# Patient Record
Sex: MALE | Race: WHITE | NOT HISPANIC OR LATINO | Employment: OTHER | URBAN - METROPOLITAN AREA
[De-identification: names, ages, dates, MRNs, and addresses within clinical notes are randomized per-mention and may not be internally consistent; named-entity substitution may affect disease eponyms.]

---

## 2023-10-12 ENCOUNTER — MEDICAL CORRESPONDENCE (OUTPATIENT)
Dept: HEALTH INFORMATION MANAGEMENT | Facility: CLINIC | Age: 63
End: 2023-10-12

## 2023-10-16 ENCOUNTER — TRANSCRIBE ORDERS (OUTPATIENT)
Dept: OTHER | Age: 63
End: 2023-10-16

## 2023-10-16 DIAGNOSIS — G35 MULTIPLE SCLEROSIS (H): Primary | ICD-10-CM

## 2023-10-16 DIAGNOSIS — G25.81 RESTLESS LEG SYNDROME: ICD-10-CM

## 2023-10-22 ENCOUNTER — HEALTH MAINTENANCE LETTER (OUTPATIENT)
Age: 63
End: 2023-10-22

## 2023-11-15 ENCOUNTER — PRE VISIT (OUTPATIENT)
Dept: NEUROLOGY | Facility: CLINIC | Age: 63
End: 2023-11-15

## 2023-11-15 ENCOUNTER — TELEPHONE (OUTPATIENT)
Dept: NEUROLOGY | Facility: CLINIC | Age: 63
End: 2023-11-15

## 2023-11-15 ENCOUNTER — LAB (OUTPATIENT)
Dept: LAB | Facility: CLINIC | Age: 63
End: 2023-11-15
Payer: COMMERCIAL

## 2023-11-15 ENCOUNTER — OFFICE VISIT (OUTPATIENT)
Dept: NEUROLOGY | Facility: CLINIC | Age: 63
End: 2023-11-15
Attending: PSYCHIATRY & NEUROLOGY
Payer: COMMERCIAL

## 2023-11-15 VITALS
WEIGHT: 262.3 LBS | BODY MASS INDEX: 31.94 KG/M2 | HEART RATE: 56 BPM | SYSTOLIC BLOOD PRESSURE: 142 MMHG | DIASTOLIC BLOOD PRESSURE: 86 MMHG | OXYGEN SATURATION: 98 % | HEIGHT: 76 IN

## 2023-11-15 DIAGNOSIS — Z51.81 THERAPEUTIC DRUG MONITORING: ICD-10-CM

## 2023-11-15 DIAGNOSIS — E55.9 VITAMIN D DEFICIENCY: ICD-10-CM

## 2023-11-15 DIAGNOSIS — G25.81 RESTLESS LEG SYNDROME: ICD-10-CM

## 2023-11-15 DIAGNOSIS — G35 MULTIPLE SCLEROSIS (H): Primary | ICD-10-CM

## 2023-11-15 DIAGNOSIS — R26.81 GAIT INSTABILITY: ICD-10-CM

## 2023-11-15 DIAGNOSIS — G35 MULTIPLE SCLEROSIS (H): ICD-10-CM

## 2023-11-15 LAB
BASOPHILS # BLD AUTO: 0.1 10E3/UL (ref 0–0.2)
BASOPHILS NFR BLD AUTO: 1 %
EOSINOPHIL # BLD AUTO: 0 10E3/UL (ref 0–0.7)
EOSINOPHIL NFR BLD AUTO: 1 %
ERYTHROCYTE [DISTWIDTH] IN BLOOD BY AUTOMATED COUNT: 13 % (ref 10–15)
FOLATE SERPL-MCNC: 19 NG/ML (ref 4.6–34.8)
HBV CORE AB SERPL QL IA: NONREACTIVE
HBV SURFACE AB SERPL IA-ACNC: 13.61 M[IU]/ML
HBV SURFACE AB SERPL IA-ACNC: REACTIVE M[IU]/ML
HBV SURFACE AG SERPL QL IA: NONREACTIVE
HCT VFR BLD AUTO: 47.6 % (ref 40–53)
HGB BLD-MCNC: 16.3 G/DL (ref 13.3–17.7)
HOLD SPECIMEN: NORMAL
IGA SERPL-MCNC: 276 MG/DL (ref 84–499)
IGG SERPL-MCNC: 1120 MG/DL (ref 610–1616)
IGM SERPL-MCNC: 118 MG/DL (ref 35–242)
IMM GRANULOCYTES # BLD: 0 10E3/UL
IMM GRANULOCYTES NFR BLD: 0 %
LYMPHOCYTES # BLD AUTO: 2.4 10E3/UL (ref 0.8–5.3)
LYMPHOCYTES NFR BLD AUTO: 35 %
MCH RBC QN AUTO: 30 PG (ref 26.5–33)
MCHC RBC AUTO-ENTMCNC: 34.2 G/DL (ref 31.5–36.5)
MCV RBC AUTO: 88 FL (ref 78–100)
MONOCYTES # BLD AUTO: 0.7 10E3/UL (ref 0–1.3)
MONOCYTES NFR BLD AUTO: 10 %
NEUTROPHILS # BLD AUTO: 3.8 10E3/UL (ref 1.6–8.3)
NEUTROPHILS NFR BLD AUTO: 53 %
NRBC # BLD AUTO: 0 10E3/UL
NRBC BLD AUTO-RTO: 0 /100
PLATELET # BLD AUTO: 221 10E3/UL (ref 150–450)
RBC # BLD AUTO: 5.43 10E6/UL (ref 4.4–5.9)
VIT B12 SERPL-MCNC: 523 PG/ML (ref 232–1245)
VIT D+METAB SERPL-MCNC: 61 NG/ML (ref 20–50)
VZV IGG SER QL IA: >4000 INDEX
VZV IGG SER QL IA: POSITIVE
WBC # BLD AUTO: 7 10E3/UL (ref 4–11)

## 2023-11-15 PROCEDURE — 82784 ASSAY IGA/IGD/IGG/IGM EACH: CPT | Performed by: PSYCHIATRY & NEUROLOGY

## 2023-11-15 PROCEDURE — 86706 HEP B SURFACE ANTIBODY: CPT | Performed by: PSYCHIATRY & NEUROLOGY

## 2023-11-15 PROCEDURE — 82607 VITAMIN B-12: CPT | Performed by: PSYCHIATRY & NEUROLOGY

## 2023-11-15 PROCEDURE — 82746 ASSAY OF FOLIC ACID SERUM: CPT | Performed by: PSYCHIATRY & NEUROLOGY

## 2023-11-15 PROCEDURE — 99205 OFFICE O/P NEW HI 60 MIN: CPT | Mod: GC | Performed by: PSYCHIATRY & NEUROLOGY

## 2023-11-15 PROCEDURE — 36415 COLL VENOUS BLD VENIPUNCTURE: CPT | Performed by: PATHOLOGY

## 2023-11-15 PROCEDURE — 86704 HEP B CORE ANTIBODY TOTAL: CPT | Performed by: PSYCHIATRY & NEUROLOGY

## 2023-11-15 PROCEDURE — 99000 SPECIMEN HANDLING OFFICE-LAB: CPT | Performed by: PATHOLOGY

## 2023-11-15 PROCEDURE — 87340 HEPATITIS B SURFACE AG IA: CPT | Performed by: PSYCHIATRY & NEUROLOGY

## 2023-11-15 PROCEDURE — G0463 HOSPITAL OUTPT CLINIC VISIT: HCPCS | Performed by: PSYCHIATRY & NEUROLOGY

## 2023-11-15 PROCEDURE — 85025 COMPLETE CBC W/AUTO DIFF WBC: CPT | Performed by: PATHOLOGY

## 2023-11-15 PROCEDURE — 86787 VARICELLA-ZOSTER ANTIBODY: CPT | Performed by: PSYCHIATRY & NEUROLOGY

## 2023-11-15 PROCEDURE — 82306 VITAMIN D 25 HYDROXY: CPT | Performed by: PSYCHIATRY & NEUROLOGY

## 2023-11-15 RX ORDER — TIZANIDINE 2 MG/1
2 TABLET ORAL
COMMUNITY
Start: 2022-12-12 | End: 2023-12-15

## 2023-11-15 RX ORDER — GLATIRAMER 40 MG/ML
INJECTION, SOLUTION SUBCUTANEOUS
COMMUNITY
Start: 2023-01-03 | End: 2024-02-28

## 2023-11-15 RX ORDER — BACLOFEN 10 MG/1
10 TABLET ORAL 3 TIMES DAILY
Qty: 90 TABLET | Refills: 11 | Status: SHIPPED | OUTPATIENT
Start: 2023-11-15 | End: 2023-11-21

## 2023-11-15 RX ORDER — B-COMPLEX WITH VITAMIN C
1 TABLET ORAL
COMMUNITY

## 2023-11-15 RX ORDER — ATORVASTATIN CALCIUM 40 MG/1
40 TABLET, FILM COATED ORAL AT BEDTIME
COMMUNITY
Start: 2023-06-01 | End: 2024-08-28

## 2023-11-15 RX ORDER — PRAMIPEXOLE DIHYDROCHLORIDE 0.5 MG/1
0.5 TABLET ORAL 3 TIMES DAILY
COMMUNITY
End: 2023-12-15

## 2023-11-15 RX ORDER — NAPROXEN 500 MG/1
TABLET ORAL
COMMUNITY
Start: 2023-06-01

## 2023-11-15 RX ORDER — CHLORAL HYDRATE 500 MG
2 CAPSULE ORAL DAILY
COMMUNITY

## 2023-11-15 RX ORDER — TAMSULOSIN HYDROCHLORIDE 0.4 MG/1
CAPSULE ORAL
COMMUNITY
Start: 2023-03-27

## 2023-11-15 RX ORDER — SILDENAFIL 100 MG/1
100 TABLET, FILM COATED ORAL
COMMUNITY
Start: 2022-12-01

## 2023-11-15 RX ORDER — MAGNESIUM OXIDE 400 MG/1
400 TABLET ORAL
COMMUNITY

## 2023-11-15 RX ORDER — ASPIRIN 81 MG/1
1 TABLET ORAL DAILY
COMMUNITY
Start: 2008-11-25

## 2023-11-15 ASSESSMENT — PAIN SCALES - GENERAL: PAINLEVEL: MILD PAIN (2)

## 2023-11-15 NOTE — NURSING NOTE
Chief Complaint   Patient presents with    MS    New Patient     Establishing MS Care      Vitals were taken and medications were reconciled.   Shine Mcpherson, EMT  8:58 AM

## 2023-11-15 NOTE — TELEPHONE ENCOUNTER
Lab called stating that Miguel would like vitamin D drawn today. Additional vial was taken so if Dr. Menezes would like to add a vitamin D level on, she can do so. Routing to Dr. Menezes.    Glenda Sharma RN

## 2023-11-15 NOTE — PATIENT INSTRUCTIONS
I agree with the diagnosis of MS     You need updated imaging - MRI brain on 7T   MRI spinal cord -- need thoracic spine included     Blood work     Try baclofen three times per day   Let me know if it makes you weaker     Follow up to discuss results

## 2023-11-15 NOTE — PROGRESS NOTES
Disease onset: ?age 48, episode of gait instability/ataxia during cardiac rehab   ?age 51, recurrent episode of gait instability     DMD hx:   Glatiramer acetate 5/2012-present    4+ ocb in csf    MRI brain   2021 - mild/mod periventricular lesion burden, gd-    MRI cervical spine   2021 - no definite lesions though study not of the best quality     Remote study (2012)w lesion at T11-12 and in conus

## 2023-11-15 NOTE — LETTER
"11/15/2023       RE: Miguel Stovall  [address] [address] [facility] [address]     Dear Colleague,    Thank you for referring your patient, Miguel Stovall, to the [facility] [place] at [facility]. Please see a copy of my visit note below.      [facility]   MS Clinic New Patient Evaluation     Chief Complaint: establish care for MS      11/15/2023   Source of information: Patient and chart review    History of Present Symptom:  Miguel Stovall is a 63 year old male with a PMH significant for RLS,  mitral valve repair 15+ years ago and hisory of MS on copaxone 40 mg 3x week, with neurogenic bladder on oxybutynin previously (recently stopped thought to be worsening RLS), who presents today to establish care and get a 2nd opinion due to progression of symptoms. He previously been seen by Dr. Farah, MS specialist at [facility]. Dr. Laboy and Dr. Rodriguez at the [facility].       In talking with Miguel today, he reports he has a diagnosis of MS with MRI and CSF at [place] in March 2012, where the MRI showed  foci of demyelination. Interestingly, his symptoms started after episodes of disequilibrium and imbalance with difficulty walking in 2008 described as feeling like a \"drunken \" while doing his cardiorehab from his MV replacement. This sensation of gait difficulty and imbalance has continued up to today, with good and bad days but notably worsened the last 3 years and most significantly the last 12 months that he is worried about. Essentially the last 3 years he has had balance and muscle fatigue while walking as his biggest concerns. He can walk about 3 blocks now before fatigue sets in, and 1 year ago this was about 1 mile with the limiting factor described as muscle heaviness and fatigue. When he was first diagnosed, he was jogging 3-5 miles lightly and working with a  until spring 2020 and able to do " "stairs more easily. He does also report he has stiffness and myalgias in his legs and has been trying to stretch more often.      He reports his brother had a progressive form of MS that progressed rapidly and this is what prompted his concern. He was previously seeing [facility] of neurology providers, and was doing MRI surveillance every 2 years. He has felt concerned that his symptoms are progressing and was not being heard and not open to discussion about alternative medications options. At his last appointment 12/2022 the provider did confirm there was \"progression\" and so he sought out another opinion at [facility] but this was dropped and he was forced to wait another 6 months.    Miguel is very concerned about his declining ability to walk and ambulate, and need to use hand rails and decline in function. He has really appreciated changes in the last 12 months and over the last 2 years. Stairs are very difficult for him, using 2 hand rails to get upwards. Even while walking he likes to hold his wife's hand to keep him stable. He also notices fatigue and decreased endurance. He does enjoy biking when he is able and enjoys that exercise. He enjoys walking barefoot to get more tactile perception. He does feel he has good strength of his legs, but walking up stairs his legs give out.  He reports his hands are fine, with no changes in coordination or fine motor movements, but does have arthritis that impair his range of motion in the mornings and improves during the day.   He denies any changes to strength in his hands.  He does not use any devices to help him walk right now, but has a cane he keeps with him, and might use it with crowded situations. He actually thinks his cane is a hindrance.  He has fallen a few times this year, notably on the stairs and this caused a orthopedic injury of his meniscus that required surgery. He thinks he would be falling 1-2x month but he takes a lot of precautions to avoid " "this.    Disease onset: 2011  Most recent relapse: he is unsure, there are no symptoms that are transient, but not clear events  Previous disease modifying therapy: Copaxone only. Never been on steroids.     Paresthesias: denies  Fatigue: does have spells of \"weakness or fatigue\" for 3-4 days at a time  Mood: upset, and depression about his worsening condition    Bowel/bladder changes: Was on Mirabegron and oxybutynin (stopped bc recent internal medicine appointment suggested this could make the bladder spasms worse), has not had any incontinence but does have bladder urgency that is worsening (0 to 100 urgency might occur, but then would not have any flow) He has been evaluated by urology and told to have very mild BPH, and started flow max.    Pain/Spasticity: He currently takes tizanidine for muscle spasms. He usually takes this nightly as well as PRN, he does get fatigued with a full dose and usually takes 1/2 tablet but is less effective. Usually 1-2pm and then nightly is his regiment. He has spasms that sometimes do not respond. He gets spasms in the knees and calves. He has onlybeen on this medication.     He takes vitD 5000 international unit(s) and extra 5000 MWF, goal level of 90.     Social history:  Mr. Stovall is  and has 2 daughters and 4 grandchildren.  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 He has a BA from [employer] in computer and business   with a masters. He enjoys boating and golfing.     No tobacco use  1-2 drinks etoh week  No rec drugs    He has not gone to the gym in years since concern for COVID, but eager to " "return and had a  in the pat.        [place].     Fhx:   Brother dx with MS at age 40's ,  at 60.  No other autoimmune and neurologic conditions in the family     The patient's medical, surgical, social, and family history were personally reviewed with the patient.  No past medical history on file.   No past surgical history on file.  Social History     Tobacco Use    Smoking status: Never    Smokeless tobacco: Never   Substance Use Topics    Alcohol use: Yes    Drug use: Never     No family history on file.  Current Outpatient Medications   Medication    aspirin 81 MG EC tablet    atorvastatin (LIPITOR) 40 MG tablet    Cholecalciferol (VITAMIN D3) 25 MCG (1000 UT) CAPS    fish oil-omega-3 fatty acids 1000 MG capsule    glatiramer acetate 40 MG/ML injection    magnesium oxide (MAG-OX) 400 MG tablet    naproxen (NAPROSYN) 500 MG tablet    pramipexole (MIRAPEX) 0.5 MG tablet    sildenafil (VIAGRA) 100 MG tablet    tamsulosin (FLOMAX) 0.4 MG capsule    tiZANidine (ZANAFLEX) 2 MG tablet    Vitamin B Complex-C CAPS     No current facility-administered medications for this visit.     No Known Allergies    Physical Examination   Vitals: BP (!) 146/85 (BP Location: Left arm, Patient Position: Sitting, Cuff Size: Adult Regular)   Pulse 66   Ht 1.93 m (6' 4\")   Wt 119 kg (262 lb 4.8 oz)   SpO2 96%   BMI 31.93 kg/m     General: Patient appears comfortable in no acute distress but is emotionally upset at times when recounting his history and progression concern  HEENT: NC/AT, no icterus, moist mucous membranes  Chest: non-labored on RA  Extremities: Warm, no edema  Skin: No rash or lesion   Psych: Affect appropriate for situation, does become emotional and upset   Neuro:  Mental status: Awake, alert, attentive. Language is fluent with intact comprehension.   Cranial nerves: pupils equal, conjugate gaze, EOMI, face symmetric, shoulder shrug strong, " tongue protrusion midline, no dysarthria.   Motor: Normal muscle bulk and tone. No abnormal movements. No increased tone on palpation of x4 extremities with no spastic catch      R L  Deltoid  5 5  Biceps  5 5  Triceps  5 5  Finger Ext 5          5      Hip flexion 4+       4+  Knee flexion 5 5  Knee ext 5 5  Dorsiflexion 5 5    Reflexes: normal reflexes symmetric in biceps, brachioradialis, brisk b/l at patellae, and trace at achilles. No clonus, no hoffmans  Sensory: Intact to light touch and temperature. Romberg is negative but there is a slight sway.   Coordination: FNF and H2S without ataxia or dysmetria.    Gait: Normal width, stride length, turn, with symmetric arm swing. Tandem walk intact fwd w/slight sway but able. Able to rise from chair with arms crossed. Not able to rise from chair with each leg individually. Able to balance on each leg for > 3-5 seconds individually, more difficult on the right than left    Laboratory:  CSF March 2012 - Protein is 48, mildly elevated. Glucose was normal at 52. IgG synthesis rate was 0.0 IgG the CSF was normal.  IgG index was 0.45 and normal. Oligoclonal bands were detected, four or more in the CSF. This indicated Demyelination.     March 23, 2012: Sedimentation rate, folate, lactate, serum ACE, rheumatoid factor, CCP, SAIRA, paraneoplastic antibody panel, were reviewed and are normal. Vitamin B12 level was low at 257,  methylmalnonic acid level was less than 0.40.     Vitamin D as of 2021 at 85.7  JCV Ab -no completed    MRI Brain  10/2021 -  personally reviewed with Dr. Menezes, no contrast enhancements but stable burden of lesions  10/2019 reviewed report  12/2015 - reviewed report 12 discrete subcentimeter foci of long TR and FLAIR signal hyperintensity in the white matter of both cerebral hemispheres, primarily in   periventricular locations, a few in the intermediate deep white matter and corpus callosum, none of which demonstrates restricted diffusion or pathologic  enhancement.       MRI Cervical spine   10/2021 - personally reviewed with Dr. Menezes, no contrast enhancements, there is some spondylosis without myelopathy or cord compression, and c3-4 foraminal stenosis worse on the left.       Assessment/Plan:  Miguel Stovall is a 63 year old male with a PMH significant for RLS,  mitral valve repair 15+ years ago and hisory of MS dx in 2012 at [place] and has been on on copaxone 40 mg 3x week and tolerating this for years, with neurogenic bladder, and spasticity of his legs, who presents today to establish care with concerns for progression of worsening gait and leg weakness notably the last 12 months but also over the last 3 years. His last b and C spine MRI was 2021, without any T spine MRI in many years. Today on examination he displays some unsteadiness with tandem walking, inability to stand with one leg, minimal ability to balance on either leg and reduced strength of the hip flexors.  Considering his history and review of imaging and discussion with the patient, I question if he has had a progressive form of MS versus rrMS that is now secondary progressive. Based on his history there is not clear descriptions of flare events to suggest a rrMS course, but with his recent worsening the last 3 years and the last 1 year, concern for secondary progressive phase of the disease is possible, notably as he describes decline in ability to walk from 3 blocks now to 1 mile a year ago. He has been having difficulty with spasticity for which we discussed switching to baclofen from tizanidine, with decreased sedition effect and more scheduled approach, and after reviewing the side effects he was agreeable to switching. We also discussed need for further imaging of spinal axis and bMRI to assess previously noted thoracic lesions that have not been visualized in many years, and could have worsened and might explain his worsening symptoms. We will obtain further workup with imaging and  labs today, and discuss treatment plan at next visit but for now should remain on his copaxone and was encouraged to continue his stretch regiment and implement a strengthening/exercise regiment. He may potentially be a candidate for switching to ocrelizumab from copaxone if workup is consistent with concern for progressive MS. Additionally, he may benefit from 4-aminopyridine to improve mobility     Plan:  -CBC, Folate, Hep B surface/core/antibodies, BELINDA virus, B12, IgG, IgA, IgM, Varicella  -Start Baclofen 10 mg q8 hr (can increase to 20 mg if well tolerated)  -MRI wwo Brain (T7), Cervical, Thoracic     Patient seen and discussed with Dr. Menezes   I have reviewed the plan with the patient, who is in agreement.    Saige Hewitt DO, YAIR  Neurology Resident, PGY-4      I personally saw and evaluated Mr. Stovall with Dr. Hewitt on the date of service.  I have reviewed the above documentation and agree with the findings and recommendations.     A total of 60 minutes were personally spent in the care of this patient on the date of service.     Kia Menezes MD on 11/24/2023 at 7:40 AM                  Disease onset: ?age 48, episode of gait instability/ataxia during cardiac rehab   ?age 51, recurrent episode of gait instability     DMD hx:   Glatiramer acetate 5/2012-present    4+ ocb in csf    MRI brain   2021 - mild/mod periventricular lesion burden, gd-    MRI cervical spine   2021 - no definite lesions though study not of the best quality     Remote study (2012)w lesion at T11-12 and in conus         Again, thank you for allowing me to participate in the care of your patient.      Sincerely,    Kia Menezes MD

## 2023-11-15 NOTE — PROGRESS NOTES
"[facility]   MS Clinic New Patient Evaluation     Chief Complaint: establish care for MS      11/15/2023   Source of information: Patient and chart review    History of Present Symptom:  Miguel Stovall is a 63 year old male with a PMH significant for RLS,  mitral valve repair 15+ years ago and hisory of MS on copaxone 40 mg 3x week, with neurogenic bladder on oxybutynin previously (recently stopped thought to be worsening RLS), who presents today to establish care and get a 2nd opinion due to progression of symptoms. He previously been seen by Dr. Farah, MS specialist at [facility]. Dr. Laboy and Dr. Rodriguez at the [facility].       In talking with Miguel today, he reports he has a diagnosis of MS with MRI and CSF at [place] in March 2012, where the MRI showed  foci of demyelination. Interestingly, his symptoms started after episodes of disequilibrium and imbalance with difficulty walking in 2008 described as feeling like a \"drunken \" while doing his cardiorehab from his MV replacement. This sensation of gait difficulty and imbalance has continued up to today, with good and bad days but notably worsened the last 3 years and most significantly the last 12 months that he is worried about. Essentially the last 3 years he has had balance and muscle fatigue while walking as his biggest concerns. He can walk about 3 blocks now before fatigue sets in, and 1 year ago this was about 1 mile with the limiting factor described as muscle heaviness and fatigue. When he was first diagnosed, he was jogging 3-5 miles lightly and working with a  until spring 2020 and able to do stairs more easily. He does also report he has stiffness and myalgias in his legs and has been trying to stretch more often.      He reports his brother had a progressive form of MS that progressed rapidly and this is what prompted his concern. He was previously seeing [facility] of neurology providers, and was doing " "MRI surveillance every 2 years. He has felt concerned that his symptoms are progressing and was not being heard and not open to discussion about alternative medications options. At his last appointment 12/2022 the provider did confirm there was \"progression\" and so he sought out another opinion at [facility] but this was dropped and he was forced to wait another 6 months.    Miguel is very concerned about his declining ability to walk and ambulate, and need to use hand rails and decline in function. He has really appreciated changes in the last 12 months and over the last 2 years. Stairs are very difficult for him, using 2 hand rails to get upwards. Even while walking he likes to hold his wife's hand to keep him stable. He also notices fatigue and decreased endurance. He does enjoy biking when he is able and enjoys that exercise. He enjoys walking barefoot to get more tactile perception. He does feel he has good strength of his legs, but walking up stairs his legs give out.  He reports his hands are fine, with no changes in coordination or fine motor movements, but does have arthritis that impair his range of motion in the mornings and improves during the day.   He denies any changes to strength in his hands.  He does not use any devices to help him walk right now, but has a cane he keeps with him, and might use it with crowded situations. He actually thinks his cane is a hindrance.  He has fallen a few times this year, notably on the stairs and this caused a orthopedic injury of his meniscus that required surgery. He thinks he would be falling 1-2x month but he takes a lot of precautions to avoid this.    Disease onset: 2011  Most recent relapse: he is unsure, there are no symptoms that are transient, but not clear events  Previous disease modifying therapy: Copaxone only. Never been on steroids.     Paresthesias: denies  Fatigue: does have spells of \"weakness or fatigue\" for 3-4 days at a time  Mood: upset, and " depression about his worsening condition    Bowel/bladder changes: Was on Mirabegron and oxybutynin (stopped bc recent internal medicine appointment suggested this could make the bladder spasms worse), has not had any incontinence but does have bladder urgency that is worsening (0 to 100 urgency might occur, but then would not have any flow) He has been evaluated by urology and told to have very mild BPH, and started flow max.    Pain/Spasticity: He currently takes tizanidine for muscle spasms. He usually takes this nightly as well as PRN, he does get fatigued with a full dose and usually takes 1/2 tablet but is less effective. Usually 1-2pm and then nightly is his regiment. He has spasms that sometimes do not respond. He gets spasms in the knees and calves. He has onlybeen on this medication.     He takes vitD 5000 international unit(s) and extra 5000 MWF, goal level of 90.     Social history:  Mr. Stovall is  and has 2 daughters and 4 grandchildren.  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 as a  with device experience in urology, neurovascular, spine, ortho, and cardiac; prior [employer]. He has a BA from [employer] in computer and business   with a masters. He enjoys boating and golfing.     No tobacco use  1-2 drinks etoh week  No rec drugs    He has not gone to the gym in years since concern for COVID, but eager to return and had a  in the pat.        [place].     Fhx:   Brother dx with MS at age 40's ,  at 60.  No other autoimmune and neurologic conditions in the family     The patient's medical, surgical, social, and family history were personally reviewed  "with the patient.  No past medical history on file.   No past surgical history on file.  Social History     Tobacco Use     Smoking status: Never     Smokeless tobacco: Never   Substance Use Topics     Alcohol use: Yes     Drug use: Never     No family history on file.  Current Outpatient Medications   Medication     aspirin 81 MG EC tablet     atorvastatin (LIPITOR) 40 MG tablet     Cholecalciferol (VITAMIN D3) 25 MCG (1000 UT) CAPS     fish oil-omega-3 fatty acids 1000 MG capsule     glatiramer acetate 40 MG/ML injection     magnesium oxide (MAG-OX) 400 MG tablet     naproxen (NAPROSYN) 500 MG tablet     pramipexole (MIRAPEX) 0.5 MG tablet     sildenafil (VIAGRA) 100 MG tablet     tamsulosin (FLOMAX) 0.4 MG capsule     tiZANidine (ZANAFLEX) 2 MG tablet     Vitamin B Complex-C CAPS     No current facility-administered medications for this visit.     No Known Allergies    Physical Examination   Vitals: BP (!) 146/85 (BP Location: Left arm, Patient Position: Sitting, Cuff Size: Adult Regular)   Pulse 66   Ht 1.93 m (6' 4\")   Wt 119 kg (262 lb 4.8 oz)   SpO2 96%   BMI 31.93 kg/m     General: Patient appears comfortable in no acute distress but is emotionally upset at times when recounting his history and progression concern  HEENT: NC/AT, no icterus, moist mucous membranes  Chest: non-labored on RA  Extremities: Warm, no edema  Skin: No rash or lesion   Psych: Affect appropriate for situation, does become emotional and upset   Neuro:  Mental status: Awake, alert, attentive. Language is fluent with intact comprehension.   Cranial nerves: pupils equal, conjugate gaze, EOMI, face symmetric, shoulder shrug strong, tongue protrusion midline, no dysarthria.   Motor: Normal muscle bulk and tone. No abnormal movements. No increased tone on palpation of x4 extremities with no spastic catch      R L  Deltoid  5 5  Biceps  5 5  Triceps  5 5  Finger Ext 5          5      Hip flexion 4+       4+  Knee flexion 5 5  Knee " ext 5 5  Dorsiflexion 5 5    Reflexes: normal reflexes symmetric in biceps, brachioradialis, brisk b/l at patellae, and trace at achilles. No clonus, no hoffmans  Sensory: Intact to light touch and temperature. Romberg is negative but there is a slight sway.   Coordination: FNF and H2S without ataxia or dysmetria.    Gait: Normal width, stride length, turn, with symmetric arm swing. Tandem walk intact fwd w/slight sway but able. Able to rise from chair with arms crossed. Not able to rise from chair with each leg individually. Able to balance on each leg for > 3-5 seconds individually, more difficult on the right than left    Laboratory:  CSF March 2012 - Protein is 48, mildly elevated. Glucose was normal at 52. IgG synthesis rate was 0.0 IgG the CSF was normal.  IgG index was 0.45 and normal. Oligoclonal bands were detected, four or more in the CSF. This indicated Demyelination.     March 23, 2012: Sedimentation rate, folate, lactate, serum ACE, rheumatoid factor, CCP, SAIRA, paraneoplastic antibody panel, were reviewed and are normal. Vitamin B12 level was low at 257,  methylmalnonic acid level was less than 0.40.     Vitamin D as of 2021 at 85.7  JCV Ab -no completed    MRI Brain  10/2021 -  personally reviewed with Dr. Menezes, no contrast enhancements but stable burden of lesions  10/2019 reviewed report  12/2015 - reviewed report 12 discrete subcentimeter foci of long TR and FLAIR signal hyperintensity in the white matter of both cerebral hemispheres, primarily in   periventricular locations, a few in the intermediate deep white matter and corpus callosum, none of which demonstrates restricted diffusion or pathologic enhancement.       MRI Cervical spine   10/2021 - personally reviewed with Dr. Menezes, no contrast enhancements, there is some spondylosis without myelopathy or cord compression, and c3-4 foraminal stenosis worse on the left.       Assessment/Plan:  Miguel Stovall is a 63 year old male with a PMH  significant for RLS,  mitral valve repair 15+ years ago and hisory of MS dx in 2012 at [place] and has been on on copaxone 40 mg 3x week and tolerating this for years, with neurogenic bladder, and spasticity of his legs, who presents today to establish care with concerns for progression of worsening gait and leg weakness notably the last 12 months but also over the last 3 years. His last b and C spine MRI was 2021, without any T spine MRI in many years. Today on examination he displays some unsteadiness with tandem walking, inability to stand with one leg, minimal ability to balance on either leg and reduced strength of the hip flexors.  Considering his history and review of imaging and discussion with the patient, I question if he has had a progressive form of MS versus rrMS that is now secondary progressive. Based on his history there is not clear descriptions of flare events to suggest a rrMS course, but with his recent worsening the last 3 years and the last 1 year, concern for secondary progressive phase of the disease is possible, notably as he describes decline in ability to walk from 3 blocks now to 1 mile a year ago. He has been having difficulty with spasticity for which we discussed switching to baclofen from tizanidine, with decreased sedition effect and more scheduled approach, and after reviewing the side effects he was agreeable to switching. We also discussed need for further imaging of spinal axis and bMRI to assess previously noted thoracic lesions that have not been visualized in many years, and could have worsened and might explain his worsening symptoms. We will obtain further workup with imaging and labs today, and discuss treatment plan at next visit but for now should remain on his copaxone and was encouraged to continue his stretch regiment and implement a strengthening/exercise regiment. He may potentially be a candidate for switching to ocrelizumab from copaxone if workup is consistent with  concern for progressive MS. Additionally, he may benefit from 4-aminopyridine to improve mobility     Plan:  -CBC, Folate, Hep B surface/core/antibodies, BELINDA virus, B12, IgG, IgA, IgM, Varicella  -Start Baclofen 10 mg q8 hr (can increase to 20 mg if well tolerated)  -MRI wwo Brain (T7), Cervical, Thoracic     Patient seen and discussed with Dr. Menezes   I have reviewed the plan with the patient, who is in agreement.    Saige Hewitt DO, YAIR  Neurology Resident, PGY-4      I personally saw and evaluated Mr. Stovall with Dr. Hewitt on the date of service.  I have reviewed the above documentation and agree with the findings and recommendations.     A total of 60 minutes were personally spent in the care of this patient on the date of service.     Kia Menezes MD on 11/24/2023 at 7:40 AM

## 2023-11-20 ENCOUNTER — MYC MEDICAL ADVICE (OUTPATIENT)
Dept: NEUROLOGY | Facility: CLINIC | Age: 63
End: 2023-11-20

## 2023-11-20 DIAGNOSIS — G35 MULTIPLE SCLEROSIS (H): ICD-10-CM

## 2023-11-20 DIAGNOSIS — R26.81 GAIT INSTABILITY: ICD-10-CM

## 2023-11-21 RX ORDER — BACLOFEN 10 MG/1
15 TABLET ORAL 3 TIMES DAILY
Qty: 405 TABLET | Refills: 3 | Status: SHIPPED | OUTPATIENT
Start: 2023-11-21

## 2023-11-21 NOTE — TELEPHONE ENCOUNTER
Pt reports 10 mg TID baclofen not adequately relieving leg spasms/cramping, but has found 15 mg TID helpful. Requesting new rx, and requesting 90 day supply. In addition, pt reports he was unable to schedule MRI on 7T due to wires used in heart surgery. Scheduled for 3T MRIs on 12/6 and 12/11.    Masha Reynaga RN

## 2023-11-27 LAB — SCANNED LAB RESULT: ABNORMAL

## 2023-12-06 ENCOUNTER — ANCILLARY PROCEDURE (OUTPATIENT)
Dept: MRI IMAGING | Facility: CLINIC | Age: 63
End: 2023-12-06
Attending: PSYCHIATRY & NEUROLOGY
Payer: COMMERCIAL

## 2023-12-06 DIAGNOSIS — G35 MULTIPLE SCLEROSIS (H): ICD-10-CM

## 2023-12-06 PROCEDURE — A9585 GADOBUTROL INJECTION: HCPCS | Performed by: RADIOLOGY

## 2023-12-06 PROCEDURE — 72156 MRI NECK SPINE W/O & W/DYE: CPT | Mod: TC | Performed by: RADIOLOGY

## 2023-12-06 PROCEDURE — 72157 MRI CHEST SPINE W/O & W/DYE: CPT | Mod: TC | Performed by: RADIOLOGY

## 2023-12-06 RX ORDER — GADOBUTROL 604.72 MG/ML
0.1 INJECTION INTRAVENOUS ONCE
Status: COMPLETED | OUTPATIENT
Start: 2023-12-06 | End: 2023-12-06

## 2023-12-06 RX ADMIN — GADOBUTROL 11.9 ML: 604.72 INJECTION INTRAVENOUS at 18:46

## 2023-12-11 ENCOUNTER — ANCILLARY PROCEDURE (OUTPATIENT)
Dept: MRI IMAGING | Facility: CLINIC | Age: 63
End: 2023-12-11
Attending: PSYCHIATRY & NEUROLOGY
Payer: COMMERCIAL

## 2023-12-11 DIAGNOSIS — G35 MULTIPLE SCLEROSIS (H): ICD-10-CM

## 2023-12-11 PROCEDURE — 70553 MRI BRAIN STEM W/O & W/DYE: CPT | Mod: TC | Performed by: RADIOLOGY

## 2023-12-11 PROCEDURE — A9585 GADOBUTROL INJECTION: HCPCS | Mod: JW | Performed by: RADIOLOGY

## 2023-12-11 RX ORDER — GADOBUTROL 604.72 MG/ML
12 INJECTION INTRAVENOUS ONCE
Status: COMPLETED | OUTPATIENT
Start: 2023-12-11 | End: 2023-12-11

## 2023-12-11 RX ADMIN — GADOBUTROL 12 ML: 604.72 INJECTION INTRAVENOUS at 09:50

## 2023-12-15 ENCOUNTER — OFFICE VISIT (OUTPATIENT)
Dept: NEUROLOGY | Facility: CLINIC | Age: 63
End: 2023-12-15
Attending: PSYCHIATRY & NEUROLOGY
Payer: COMMERCIAL

## 2023-12-15 VITALS
BODY MASS INDEX: 32.05 KG/M2 | HEART RATE: 77 BPM | WEIGHT: 263.3 LBS | OXYGEN SATURATION: 98 % | SYSTOLIC BLOOD PRESSURE: 127 MMHG | DIASTOLIC BLOOD PRESSURE: 80 MMHG

## 2023-12-15 DIAGNOSIS — G35 MS (MULTIPLE SCLEROSIS) (H): Primary | ICD-10-CM

## 2023-12-15 PROCEDURE — 99214 OFFICE O/P EST MOD 30 MIN: CPT | Performed by: PSYCHIATRY & NEUROLOGY

## 2023-12-15 PROCEDURE — G0463 HOSPITAL OUTPT CLINIC VISIT: HCPCS | Performed by: PSYCHIATRY & NEUROLOGY

## 2023-12-15 RX ORDER — ALBUTEROL SULFATE 90 UG/1
1-2 AEROSOL, METERED RESPIRATORY (INHALATION)
Status: CANCELLED
Start: 2023-12-15

## 2023-12-15 RX ORDER — DIPHENHYDRAMINE HYDROCHLORIDE 50 MG/ML
50 INJECTION INTRAMUSCULAR; INTRAVENOUS
Status: CANCELLED
Start: 2023-12-15

## 2023-12-15 RX ORDER — ACETAMINOPHEN 325 MG/1
650 TABLET ORAL ONCE
Status: CANCELLED | OUTPATIENT
Start: 2023-12-15

## 2023-12-15 RX ORDER — MEPERIDINE HYDROCHLORIDE 25 MG/ML
25 INJECTION INTRAMUSCULAR; INTRAVENOUS; SUBCUTANEOUS EVERY 30 MIN PRN
Status: CANCELLED | OUTPATIENT
Start: 2023-12-15

## 2023-12-15 RX ORDER — HEPARIN SODIUM (PORCINE) LOCK FLUSH IV SOLN 100 UNIT/ML 100 UNIT/ML
5 SOLUTION INTRAVENOUS
Status: CANCELLED | OUTPATIENT
Start: 2023-12-15

## 2023-12-15 RX ORDER — METHYLPREDNISOLONE SODIUM SUCCINATE 125 MG/2ML
125 INJECTION, POWDER, LYOPHILIZED, FOR SOLUTION INTRAMUSCULAR; INTRAVENOUS ONCE
Status: CANCELLED | OUTPATIENT
Start: 2023-12-15

## 2023-12-15 RX ORDER — METHYLPREDNISOLONE SODIUM SUCCINATE 125 MG/2ML
125 INJECTION, POWDER, LYOPHILIZED, FOR SOLUTION INTRAMUSCULAR; INTRAVENOUS
Status: CANCELLED
Start: 2023-12-15

## 2023-12-15 RX ORDER — DIPHENHYDRAMINE HCL 25 MG
50 CAPSULE ORAL ONCE
Status: CANCELLED | OUTPATIENT
Start: 2023-12-15

## 2023-12-15 RX ORDER — HEPARIN SODIUM,PORCINE 10 UNIT/ML
5-20 VIAL (ML) INTRAVENOUS DAILY PRN
Status: CANCELLED | OUTPATIENT
Start: 2023-12-15

## 2023-12-15 RX ORDER — EPINEPHRINE 1 MG/ML
0.3 INJECTION, SOLUTION, CONCENTRATE INTRAVENOUS EVERY 5 MIN PRN
Status: CANCELLED | OUTPATIENT
Start: 2023-12-15

## 2023-12-15 RX ORDER — ALBUTEROL SULFATE 0.83 MG/ML
2.5 SOLUTION RESPIRATORY (INHALATION)
Status: CANCELLED | OUTPATIENT
Start: 2023-12-15

## 2023-12-15 ASSESSMENT — PAIN SCALES - GENERAL: PAINLEVEL: NO PAIN (0)

## 2023-12-15 NOTE — NURSING NOTE
Chief Complaint   Patient presents with    MS    RECHECK     Ms follow up      Vitals were taken and medications were reconciled.   Shine Mcpherson, EMT  2:57 PM

## 2023-12-15 NOTE — PATIENT INSTRUCTIONS
Instructed to start walking around the home twice a day  Limit snacks  Proceed with ocrevus     Complete your current copaxone supply - can stop the day before    Continue baclofen - okay to take 1 extra tablet as needed    Follow up in 3-4 months

## 2023-12-15 NOTE — LETTER
12/15/2023       RE: Miguel Stovall  [address] [address]       Dear Colleague,    Thank you for referring your patient, Miguel Stovall, to the [facility] [place] at [facility]. Please see a copy of my visit note below.    Date of Service: 12/15/2023    [facility]   MS Clinic Evaluation    Subjective: 62 y/o man who presents in follow up for MS     PMH:   RLS   Mitral valve repair     Since his last visit he was started taking baclofen. Currently taking 10 mg in morning, 10 mg midday, and 20 mg at bedtime.     Tolerating well.    When took 20 mg three times per day he suffered somnolence.     Cramps have resolved with initiation of baclofen. Gait improved.  Able to walk over 5000 steps in a day.  Notes that a year ago he could do 10k steps/day.  Able to dance 4 songs when he recently attended a wedding.     Disease onset: ?age 48, episode of gait instability/ataxia during cardiac rehab   ?age 51, recurrent episode of gait instability      DMD hx:   Glatiramer acetate 5/2012-present    No Known Allergies    Current Outpatient Medications   Medication    aspirin 81 MG EC tablet    atorvastatin (LIPITOR) 40 MG tablet    baclofen (LIORESAL) 10 MG tablet    Cholecalciferol (VITAMIN D3) 25 MCG (1000 UT) CAPS    fish oil-omega-3 fatty acids 1000 MG capsule    glatiramer acetate 40 MG/ML injection    magnesium oxide (MAG-OX) 400 MG tablet    naproxen (NAPROSYN) 500 MG tablet    sildenafil (VIAGRA) 100 MG tablet    tamsulosin (FLOMAX) 0.4 MG capsule    Vitamin B Complex-C CAPS     No current facility-administered medications for this visit.        Past medical, surgical, social and family history was personally reviewed. Pertinent details noted above.     Physical Examination:   /80 (BP Location: Left arm, Patient Position: Sitting, Cuff Size: Adult Regular)   Pulse 77   Wt 119.4 kg (263 lb 4.8 oz)   SpO2  98%   BMI 32.05 kg/m      General: no acute distress      Tests/Imagin+ ocb in csf    Vitamin D 61  JCV Ab 0.64    Abs Lymph 2400  igg 1120    MRI brain    - mild/mod periventricular lesion burden, gd-  2023 - no definite new lesions, gd-      MRI cervical spine    - no definite lesions though study not of the best quality   2023 - no definite new lesions    MRI Thoracic spine    - report indicates lesion T11-12, conus  2023 - no definite lesions    Assessment: 62 y/o man with chronic multiple sclerosis. He recently suffered a subacute decline, which is suggestive of active disease. Updated MRIs without a discrete new lesion. However, given the clinical decline, advise advancement of treatment.     We reviewed the risks and benefits of ocrevus in detail.  He understands that he will be immune suppressed while on this medication. Risk of infusion reaction reviewed.     Advised he continue copaxone until the day before infusion, or until current supply runs out.     Reviewed expectations for ocrevus treatment. It will help him maintain his current level of function and is not expected to result in clinical improvement.     Plan:   - proceed with ocrevus   - follow up in 3-4 months    Note was completed with the assistance of Dragon Fluency software which can often result in accidental word substitutions.     A total of 30 minutes on the date of service were spent in the care of this patient.   Kia Menezes MD on 2023 at 8:35 AM          Again, thank you for allowing me to participate in the care of your patient.      Sincerely,    Kia Menezes MD

## 2023-12-17 NOTE — PROGRESS NOTES
Date of Service: 12/15/2023    [facility]   MS Clinic Evaluation    Subjective: 62 y/o man who presents in follow up for MS     PMH:   RLS   Mitral valve repair     Since his last visit he was started taking baclofen. Currently taking 10 mg in morning, 10 mg midday, and 20 mg at bedtime.     Tolerating well.    When took 20 mg three times per day he suffered somnolence.     Cramps have resolved with initiation of baclofen. Gait improved.  Able to walk over 5000 steps in a day.  Notes that a year ago he could do 10k steps/day.  Able to dance 4 songs when he recently attended a wedding.     Disease onset: ?age 48, episode of gait instability/ataxia during cardiac rehab   ?age 51, recurrent episode of gait instability      DMD hx:   Glatiramer acetate 2012-present    No Known Allergies    Current Outpatient Medications   Medication     aspirin 81 MG EC tablet     atorvastatin (LIPITOR) 40 MG tablet     baclofen (LIORESAL) 10 MG tablet     Cholecalciferol (VITAMIN D3) 25 MCG (1000 UT) CAPS     fish oil-omega-3 fatty acids 1000 MG capsule     glatiramer acetate 40 MG/ML injection     magnesium oxide (MAG-OX) 400 MG tablet     naproxen (NAPROSYN) 500 MG tablet     sildenafil (VIAGRA) 100 MG tablet     tamsulosin (FLOMAX) 0.4 MG capsule     Vitamin B Complex-C CAPS     No current facility-administered medications for this visit.        Past medical, surgical, social and family history was personally reviewed. Pertinent details noted above.     Physical Examination:   /80 (BP Location: Left arm, Patient Position: Sitting, Cuff Size: Adult Regular)   Pulse 77   Wt 119.4 kg (263 lb 4.8 oz)   SpO2 98%   BMI 32.05 kg/m      General: no acute distress      Tests/Imagin+ ocb in csf    Vitamin D 61  JCV Ab 0.64    Abs Lymph 2400  igg 1120    MRI brain    - mild/mod periventricular lesion burden, gd-  2023 - no definite new lesions, gd-      MRI cervical spine    - no definite lesions though  study not of the best quality   12/2023 - no definite new lesions    MRI Thoracic spine   2012 - report indicates lesion T11-12, conus  12/2023 - no definite lesions    Assessment: 62 y/o man with chronic multiple sclerosis. He recently suffered a subacute decline, which is suggestive of active disease. Updated MRIs without a discrete new lesion. However, given the clinical decline, advise advancement of treatment.     We reviewed the risks and benefits of ocrevus in detail.  He understands that he will be immune suppressed while on this medication. Risk of infusion reaction reviewed.     Advised he continue copaxone until the day before infusion, or until current supply runs out.     Reviewed expectations for ocrevus treatment. It will help him maintain his current level of function and is not expected to result in clinical improvement.     Plan:   - proceed with ocrevus   - follow up in 3-4 months    Note was completed with the assistance of Dragon Fluency software which can often result in accidental word substitutions.     A total of 30 minutes on the date of service were spent in the care of this patient.   Kia Menezes MD on 12/17/2023 at 8:35 AM

## 2023-12-21 ENCOUNTER — MYC MEDICAL ADVICE (OUTPATIENT)
Dept: NEUROLOGY | Facility: CLINIC | Age: 63
End: 2023-12-21

## 2023-12-21 DIAGNOSIS — R26.81 GAIT INSTABILITY: ICD-10-CM

## 2023-12-21 DIAGNOSIS — G35 MS (MULTIPLE SCLEROSIS) (H): Primary | ICD-10-CM

## 2023-12-22 NOTE — TELEPHONE ENCOUNTER
I can refer him to a physical therapist who will challenge him   Let me know if he would like a PT referral - there is a specific one in [facility] who I would recommend  Kia Menezes MD on 12/22/2023 at 11:07 AM

## 2024-01-08 ENCOUNTER — DOCUMENTATION ONLY (OUTPATIENT)
Dept: NEUROLOGY | Facility: CLINIC | Age: 64
End: 2024-01-08
Payer: COMMERCIAL

## 2024-01-08 NOTE — PROGRESS NOTES
Ocrevus access solution notice received, patient request is currently in process. [id number].  Shine Mcpherson EMT 01/08/2024 8:50AM

## 2024-01-09 NOTE — PROGRESS NOTES
Ocrevus access solution notice received, patient has been enrolled in the Ocrevus Co-Pay program. ID number [id number].  Shine [place] 01/09/2024 12:22PM    Leonardo Vu, RN

## 2024-02-08 NOTE — PROGRESS NOTES
Additional information request form has been signed and faxed back to [facility] at [phone].  Shine Mcpherson EMT 02/08/2024 8:12PM

## 2024-02-13 ENCOUNTER — MEDICAL CORRESPONDENCE (OUTPATIENT)
Dept: HEALTH INFORMATION MANAGEMENT | Facility: CLINIC | Age: 64
End: 2024-02-13
Payer: COMMERCIAL

## 2024-02-13 ENCOUNTER — TRANSFERRED RECORDS (OUTPATIENT)
Dept: HEALTH INFORMATION MANAGEMENT | Facility: CLINIC | Age: 64
End: 2024-02-13
Payer: COMMERCIAL

## 2024-02-14 ENCOUNTER — INFUSION THERAPY VISIT (OUTPATIENT)
Dept: INFUSION THERAPY | Facility: CLINIC | Age: 64
End: 2024-02-14
Attending: NURSE PRACTITIONER
Payer: COMMERCIAL

## 2024-02-14 VITALS
TEMPERATURE: 97.7 F | BODY MASS INDEX: 33.52 KG/M2 | SYSTOLIC BLOOD PRESSURE: 123 MMHG | OXYGEN SATURATION: 96 % | RESPIRATION RATE: 16 BRPM | DIASTOLIC BLOOD PRESSURE: 81 MMHG | HEIGHT: 75 IN | WEIGHT: 269.6 LBS | HEART RATE: 67 BPM

## 2024-02-14 DIAGNOSIS — G35 MS (MULTIPLE SCLEROSIS) (H): Primary | ICD-10-CM

## 2024-02-14 PROCEDURE — 96366 THER/PROPH/DIAG IV INF ADDON: CPT

## 2024-02-14 PROCEDURE — 96375 TX/PRO/DX INJ NEW DRUG ADDON: CPT

## 2024-02-14 PROCEDURE — 96365 THER/PROPH/DIAG IV INF INIT: CPT

## 2024-02-14 PROCEDURE — 258N000003 HC RX IP 258 OP 636: Performed by: PSYCHIATRY & NEUROLOGY

## 2024-02-14 PROCEDURE — 250N000011 HC RX IP 250 OP 636: Performed by: PSYCHIATRY & NEUROLOGY

## 2024-02-14 PROCEDURE — 99207 PR NO CHARGE LOS: CPT

## 2024-02-14 PROCEDURE — 250N000013 HC RX MED GY IP 250 OP 250 PS 637: Performed by: PSYCHIATRY & NEUROLOGY

## 2024-02-14 RX ORDER — DIPHENHYDRAMINE HCL 25 MG
50 CAPSULE ORAL ONCE
Status: COMPLETED | OUTPATIENT
Start: 2024-02-14 | End: 2024-02-14

## 2024-02-14 RX ORDER — ACETAMINOPHEN 325 MG/1
650 TABLET ORAL ONCE
Status: CANCELLED | OUTPATIENT
Start: 2024-02-28

## 2024-02-14 RX ORDER — HEPARIN SODIUM,PORCINE 10 UNIT/ML
5-20 VIAL (ML) INTRAVENOUS DAILY PRN
Status: CANCELLED | OUTPATIENT
Start: 2024-02-28

## 2024-02-14 RX ORDER — HEPARIN SODIUM (PORCINE) LOCK FLUSH IV SOLN 100 UNIT/ML 100 UNIT/ML
5 SOLUTION INTRAVENOUS
Status: CANCELLED | OUTPATIENT
Start: 2024-02-28

## 2024-02-14 RX ORDER — METHYLPREDNISOLONE SODIUM SUCCINATE 125 MG/2ML
125 INJECTION, POWDER, LYOPHILIZED, FOR SOLUTION INTRAMUSCULAR; INTRAVENOUS
Status: CANCELLED
Start: 2024-02-28

## 2024-02-14 RX ORDER — ACETAMINOPHEN 325 MG/1
650 TABLET ORAL ONCE
Status: COMPLETED | OUTPATIENT
Start: 2024-02-14 | End: 2024-02-14

## 2024-02-14 RX ORDER — DIPHENHYDRAMINE HYDROCHLORIDE 50 MG/ML
50 INJECTION INTRAMUSCULAR; INTRAVENOUS
Status: CANCELLED
Start: 2024-02-28

## 2024-02-14 RX ORDER — METHYLPREDNISOLONE SODIUM SUCCINATE 125 MG/2ML
125 INJECTION, POWDER, LYOPHILIZED, FOR SOLUTION INTRAMUSCULAR; INTRAVENOUS ONCE
Status: COMPLETED | OUTPATIENT
Start: 2024-02-14 | End: 2024-02-14

## 2024-02-14 RX ORDER — DIPHENHYDRAMINE HCL 25 MG
50 CAPSULE ORAL ONCE
Status: CANCELLED | OUTPATIENT
Start: 2024-02-28

## 2024-02-14 RX ORDER — MEPERIDINE HYDROCHLORIDE 25 MG/ML
25 INJECTION INTRAMUSCULAR; INTRAVENOUS; SUBCUTANEOUS EVERY 30 MIN PRN
Status: CANCELLED | OUTPATIENT
Start: 2024-02-28

## 2024-02-14 RX ORDER — ALBUTEROL SULFATE 0.83 MG/ML
2.5 SOLUTION RESPIRATORY (INHALATION)
Status: CANCELLED | OUTPATIENT
Start: 2024-02-28

## 2024-02-14 RX ORDER — EPINEPHRINE 1 MG/ML
0.3 INJECTION, SOLUTION INTRAMUSCULAR; SUBCUTANEOUS EVERY 5 MIN PRN
Status: CANCELLED | OUTPATIENT
Start: 2024-02-28

## 2024-02-14 RX ORDER — METHYLPREDNISOLONE SODIUM SUCCINATE 125 MG/2ML
125 INJECTION, POWDER, LYOPHILIZED, FOR SOLUTION INTRAMUSCULAR; INTRAVENOUS ONCE
Status: CANCELLED | OUTPATIENT
Start: 2024-02-28

## 2024-02-14 RX ORDER — ALBUTEROL SULFATE 90 UG/1
1-2 AEROSOL, METERED RESPIRATORY (INHALATION)
Status: CANCELLED
Start: 2024-02-28

## 2024-02-14 RX ADMIN — DIPHENHYDRAMINE HYDROCHLORIDE 50 MG: 25 CAPSULE ORAL at 08:32

## 2024-02-14 RX ADMIN — OCRELIZUMAB 300 MG: 300 INJECTION INTRAVENOUS at 09:00

## 2024-02-14 RX ADMIN — METHYLPREDNISOLONE SODIUM SUCCINATE 125 MG: 125 INJECTION, POWDER, FOR SOLUTION INTRAMUSCULAR; INTRAVENOUS at 08:33

## 2024-02-14 RX ADMIN — SODIUM CHLORIDE 250 ML: 9 INJECTION, SOLUTION INTRAVENOUS at 08:38

## 2024-02-14 RX ADMIN — ACETAMINOPHEN 650 MG: 325 TABLET ORAL at 08:33

## 2024-02-14 NOTE — PROGRESS NOTES
Infusion Nursing Note:  Miguel Stovall presents today for NEW Ocrevus.    Patient seen by provider today: No   present during visit today: Not Applicable.    Note: Patient new to [facility], oriented to facility including call light use. Written information on Ocrevus reviewed with patient and wife. Questions answered to their satisfaciton.    Intravenous Access:  Peripheral IV placed.    Treatment Conditions:  Biological Infusion Checklist:  ~~~ NOTE: If the patient answers yes to any of the questions below, hold the infusion and contact ordering provider or on-call provider.    Have you recently had an elevated temperature, fever, chills, productive cough, coughing for 3 weeks or longer or hemoptysis,  abnormal vital signs, night sweats,  chest pain or have you noticed a decrease in your appetite, unexplained weight loss or fatigue? No  Do you have any open wounds or new incisions? No  Do you have any upcoming hospitalizations or surgeries? Does not include esophagogastroduodenoscopy, colonoscopy, endoscopic retrograde cholangiopancreatography (ERCP), endoscopic ultrasound (EUS), dental procedures or joint aspiration/steroid injections No  Do you currently have any signs of illness or infection or are you on any antibiotics? No  Have you had any new, sudden or worsening abdominal pain? No  Have you or anyone in your household received a live vaccination in the past 4 weeks? Please note: No live vaccines while on biologic/chemotherapy until 6 months after the last treatment. Patient can receive the flu vaccine (shot only), pneumovax and the Covid vaccine. It is optimal for the patient to get these vaccines mid cycle, but they can be given at any time as long as it is not on the day of the infusion. No  Have you recently been diagnosed with any new nervous system diseases (ie. Multiple sclerosis, Guillain [place], seizures, neurological changes) or cancer diagnosis? Are you on any form of radiation or  chemotherapy? No  Are you pregnant or breast feeding or do you have plans of pregnancy in the future? No  Have you been having any signs of worsening depression or suicidal ideations?  (benlysta only) No  Have there been any other new onset medical symptoms? No  Have you had any new blood clots? (IVIG only) No    Post Infusion Assessment:  Patient tolerated infusion without incident.  Patient observed for 60 minutes post Ocrevus per protocol.  Site patent and intact, free from redness, edema or discomfort.  No evidence of extravasations.  Access discontinued per protocol.  Biologic Infusion Post Education: Call the triage nurse at your clinic or seek medical attention if you have chills and/or temperature greater than or equal to 100.5, uncontrolled nausea/vomiting, diarrhea, constipation, dizziness, shortness of breath, chest pain, heart palpitations, weakness or any other new or concerning symptoms, questions or concerns.  You cannot have any live virus vaccines prior to or during treatment or up to 6 months post infusion.  If you have an upcoming surgery, medical procedure or dental procedure during treatment, this should be discussed with your ordering physician and your surgeon/dentist.  If you are having any concerning symptom, if you are unsure if you should get your next infusion or wish to speak to a provider before your next infusion, please call your care coordinator or triage nurse at your clinic to notify them so we can adequately serve you.     Discharge Plan:   Future appts have been reviewed and crosschecked with appt note and plan.  AVS to patient via [employer].  Patient will return 2/28/24 for next appointment.   Patient discharged in stable condition accompanied by: wife.  Departure Mode: Ambulatory.      Ivana Robin RN BSN OCN

## 2024-02-15 ENCOUNTER — DOCUMENTATION ONLY (OUTPATIENT)
Dept: NEUROLOGY | Facility: CLINIC | Age: 64
End: 2024-02-15
Payer: COMMERCIAL

## 2024-02-15 NOTE — PROGRESS NOTES
Initial evaluation report for physical therapy has been received from [employer] [place], report placed in Dr. Menezes's folder for review and signature.   Shine Mcpherson EMT 02/15/2024 12:45PM

## 2024-02-20 NOTE — PROGRESS NOTES
Initial evaluation for physical therapy has been signed and faxed back at [phone].  Shine Mcpherson EMT 02/20/2024 10:10AM

## 2024-02-28 ENCOUNTER — INFUSION THERAPY VISIT (OUTPATIENT)
Dept: INFUSION THERAPY | Facility: CLINIC | Age: 64
End: 2024-02-28
Attending: NURSE PRACTITIONER
Payer: COMMERCIAL

## 2024-02-28 VITALS
OXYGEN SATURATION: 96 % | DIASTOLIC BLOOD PRESSURE: 81 MMHG | SYSTOLIC BLOOD PRESSURE: 122 MMHG | RESPIRATION RATE: 12 BRPM | TEMPERATURE: 97.9 F | HEART RATE: 70 BPM | BODY MASS INDEX: 33.2 KG/M2 | WEIGHT: 267.4 LBS

## 2024-02-28 DIAGNOSIS — G35 MS (MULTIPLE SCLEROSIS) (H): Primary | ICD-10-CM

## 2024-02-28 PROCEDURE — 250N000011 HC RX IP 250 OP 636: Performed by: PSYCHIATRY & NEUROLOGY

## 2024-02-28 PROCEDURE — 96375 TX/PRO/DX INJ NEW DRUG ADDON: CPT

## 2024-02-28 PROCEDURE — 99207 PR NO CHARGE LOS: CPT

## 2024-02-28 PROCEDURE — 258N000003 HC RX IP 258 OP 636: Performed by: PSYCHIATRY & NEUROLOGY

## 2024-02-28 PROCEDURE — 96365 THER/PROPH/DIAG IV INF INIT: CPT

## 2024-02-28 PROCEDURE — 96366 THER/PROPH/DIAG IV INF ADDON: CPT

## 2024-02-28 PROCEDURE — 250N000013 HC RX MED GY IP 250 OP 250 PS 637: Performed by: PSYCHIATRY & NEUROLOGY

## 2024-02-28 RX ORDER — ACETAMINOPHEN 325 MG/1
650 TABLET ORAL ONCE
Status: COMPLETED | OUTPATIENT
Start: 2024-02-28 | End: 2024-02-28

## 2024-02-28 RX ORDER — METHYLPREDNISOLONE SODIUM SUCCINATE 125 MG/2ML
125 INJECTION, POWDER, LYOPHILIZED, FOR SOLUTION INTRAMUSCULAR; INTRAVENOUS ONCE
Status: CANCELLED | OUTPATIENT
Start: 2024-02-28

## 2024-02-28 RX ORDER — DIPHENHYDRAMINE HYDROCHLORIDE 50 MG/ML
50 INJECTION INTRAMUSCULAR; INTRAVENOUS
Status: CANCELLED
Start: 2024-02-28

## 2024-02-28 RX ORDER — EPINEPHRINE 1 MG/ML
0.3 INJECTION, SOLUTION INTRAMUSCULAR; SUBCUTANEOUS EVERY 5 MIN PRN
Status: CANCELLED | OUTPATIENT
Start: 2024-02-28

## 2024-02-28 RX ORDER — ACETAMINOPHEN 325 MG/1
650 TABLET ORAL ONCE
Status: CANCELLED | OUTPATIENT
Start: 2024-02-28

## 2024-02-28 RX ORDER — MEPERIDINE HYDROCHLORIDE 25 MG/ML
25 INJECTION INTRAMUSCULAR; INTRAVENOUS; SUBCUTANEOUS EVERY 30 MIN PRN
Status: CANCELLED | OUTPATIENT
Start: 2024-02-28

## 2024-02-28 RX ORDER — ALBUTEROL SULFATE 0.83 MG/ML
2.5 SOLUTION RESPIRATORY (INHALATION)
Status: CANCELLED | OUTPATIENT
Start: 2024-02-28

## 2024-02-28 RX ORDER — HEPARIN SODIUM,PORCINE 10 UNIT/ML
5-20 VIAL (ML) INTRAVENOUS DAILY PRN
Status: CANCELLED | OUTPATIENT
Start: 2024-02-28

## 2024-02-28 RX ORDER — ALBUTEROL SULFATE 90 UG/1
1-2 AEROSOL, METERED RESPIRATORY (INHALATION)
Status: CANCELLED
Start: 2024-02-28

## 2024-02-28 RX ORDER — METHYLPREDNISOLONE SODIUM SUCCINATE 125 MG/2ML
125 INJECTION, POWDER, LYOPHILIZED, FOR SOLUTION INTRAMUSCULAR; INTRAVENOUS ONCE
Status: COMPLETED | OUTPATIENT
Start: 2024-02-28 | End: 2024-02-28

## 2024-02-28 RX ORDER — METHYLPREDNISOLONE SODIUM SUCCINATE 125 MG/2ML
125 INJECTION, POWDER, LYOPHILIZED, FOR SOLUTION INTRAMUSCULAR; INTRAVENOUS
Status: CANCELLED
Start: 2024-02-28

## 2024-02-28 RX ORDER — DIPHENHYDRAMINE HCL 25 MG
50 CAPSULE ORAL ONCE
Status: CANCELLED | OUTPATIENT
Start: 2024-02-28

## 2024-02-28 RX ORDER — HEPARIN SODIUM (PORCINE) LOCK FLUSH IV SOLN 100 UNIT/ML 100 UNIT/ML
5 SOLUTION INTRAVENOUS
Status: CANCELLED | OUTPATIENT
Start: 2024-02-28

## 2024-02-28 RX ORDER — DIPHENHYDRAMINE HCL 25 MG
50 CAPSULE ORAL ONCE
Status: COMPLETED | OUTPATIENT
Start: 2024-02-28 | End: 2024-02-28

## 2024-02-28 RX ADMIN — SODIUM CHLORIDE 250 ML: 9 INJECTION, SOLUTION INTRAVENOUS at 08:30

## 2024-02-28 RX ADMIN — METHYLPREDNISOLONE SODIUM SUCCINATE 125 MG: 125 INJECTION, POWDER, FOR SOLUTION INTRAMUSCULAR; INTRAVENOUS at 08:37

## 2024-02-28 RX ADMIN — DIPHENHYDRAMINE HYDROCHLORIDE 50 MG: 25 CAPSULE ORAL at 08:36

## 2024-02-28 RX ADMIN — OCRELIZUMAB 300 MG: 300 INJECTION INTRAVENOUS at 09:04

## 2024-02-28 RX ADMIN — ACETAMINOPHEN 650 MG: 325 TABLET ORAL at 08:36

## 2024-02-28 NOTE — PROGRESS NOTES
Infusion Nursing Note:  Miguel Stovall presents today for Ocrevus day 15.    Patient seen by provider today: No   present during visit today: Not Applicable.    Note:   Patient states the first dose of Ocrevus went really well.     States he has been doing therapy, and in the past couple of weeks, his strength has improved and fatigue has decreased.    Intravenous Access:  Peripheral IV placed.    Treatment Conditions:  Biological Infusion Checklist:  ~~~ NOTE: If the patient answers yes to any of the questions below, hold the infusion and contact ordering provider or on-call provider.    Have you recently had an elevated temperature, fever, chills, productive cough, coughing for 3 weeks or longer or hemoptysis,  abnormal vital signs, night sweats,  chest pain or have you noticed a decrease in your appetite, unexplained weight loss or fatigue? No  Do you have any open wounds or new incisions? No  Do you have any upcoming hospitalizations or surgeries? Does not include esophagogastroduodenoscopy, colonoscopy, endoscopic retrograde cholangiopancreatography (ERCP), endoscopic ultrasound (EUS), dental procedures or joint aspiration/steroid injections No  Do you currently have any signs of illness or infection or are you on any antibiotics? No  Have you had any new, sudden or worsening abdominal pain? No  Have you or anyone in your household received a live vaccination in the past 4 weeks? Please note: No live vaccines while on biologic/chemotherapy until 6 months after the last treatment. Patient can receive the flu vaccine (shot only), pneumovax and the Covid vaccine. It is optimal for the patient to get these vaccines mid cycle, but they can be given at any time as long as it is not on the day of the infusion. No  Have you recently been diagnosed with any new nervous system diseases (ie. Multiple sclerosis, Guillain [place], seizures, neurological changes) or cancer diagnosis? Are you on any form of  radiation or chemotherapy? No  Have there been any other new onset medical symptoms? No    Post Infusion Assessment:  Patient tolerated infusion without incident.  Patient observed for 60 minutes post Ocrevus per protocol.  Blood return noted pre and post infusion.  Site patent and intact, free from redness, edema or discomfort.  No evidence of extravasations.  Access discontinued per protocol.       Discharge Plan:   AVS to patient via [employer].  Patient will return 8/28/24 for next appointment.   Patient discharged in stable condition accompanied by: self.  Departure Mode: Ambulatory.      Tri Reeves RN

## 2024-03-11 ENCOUNTER — TRANSFERRED RECORDS (OUTPATIENT)
Dept: HEALTH INFORMATION MANAGEMENT | Facility: CLINIC | Age: 64
End: 2024-03-11
Payer: COMMERCIAL

## 2024-03-15 NOTE — PROGRESS NOTES
Progress note has been received from [employer], report has been placed in Dr. Menezes's folder for review.   Shine Mcpherson EMT 03/15/2024 3:12PM

## 2024-03-26 ENCOUNTER — LAB (OUTPATIENT)
Dept: LAB | Facility: CLINIC | Age: 64
End: 2024-03-26
Payer: COMMERCIAL

## 2024-03-26 ENCOUNTER — OFFICE VISIT (OUTPATIENT)
Dept: NEUROLOGY | Facility: CLINIC | Age: 64
End: 2024-03-26
Attending: PSYCHIATRY & NEUROLOGY
Payer: COMMERCIAL

## 2024-03-26 VITALS
OXYGEN SATURATION: 95 % | BODY MASS INDEX: 33.21 KG/M2 | DIASTOLIC BLOOD PRESSURE: 81 MMHG | SYSTOLIC BLOOD PRESSURE: 138 MMHG | WEIGHT: 267.5 LBS | HEART RATE: 65 BPM

## 2024-03-26 DIAGNOSIS — Z51.81 THERAPEUTIC DRUG MONITORING: ICD-10-CM

## 2024-03-26 DIAGNOSIS — G35 MS (MULTIPLE SCLEROSIS) (H): Primary | ICD-10-CM

## 2024-03-26 DIAGNOSIS — G35 MS (MULTIPLE SCLEROSIS) (H): ICD-10-CM

## 2024-03-26 LAB
BASOPHILS # BLD AUTO: 0 10E3/UL (ref 0–0.2)
BASOPHILS NFR BLD AUTO: 1 %
CD19 B CELL COMMENT: ABNORMAL
CD19 CELLS # BLD: <1 CELLS/UL (ref 107–698)
CD19 CELLS NFR BLD: <1 % (ref 6–27)
EOSINOPHIL # BLD AUTO: 0.1 10E3/UL (ref 0–0.7)
EOSINOPHIL NFR BLD AUTO: 1 %
ERYTHROCYTE [DISTWIDTH] IN BLOOD BY AUTOMATED COUNT: 13.2 % (ref 10–15)
HCT VFR BLD AUTO: 45.1 % (ref 40–53)
HGB BLD-MCNC: 15.4 G/DL (ref 13.3–17.7)
IMM GRANULOCYTES # BLD: 0 10E3/UL
IMM GRANULOCYTES NFR BLD: 0 %
LYMPHOCYTES # BLD AUTO: 1.6 10E3/UL (ref 0.8–5.3)
LYMPHOCYTES NFR BLD AUTO: 29 %
MCH RBC QN AUTO: 29.7 PG (ref 26.5–33)
MCHC RBC AUTO-ENTMCNC: 34.1 G/DL (ref 31.5–36.5)
MCV RBC AUTO: 87 FL (ref 78–100)
MONOCYTES # BLD AUTO: 0.8 10E3/UL (ref 0–1.3)
MONOCYTES NFR BLD AUTO: 14 %
NEUTROPHILS # BLD AUTO: 3.1 10E3/UL (ref 1.6–8.3)
NEUTROPHILS NFR BLD AUTO: 55 %
NRBC # BLD AUTO: 0 10E3/UL
NRBC BLD AUTO-RTO: 0 /100
PLATELET # BLD AUTO: 203 10E3/UL (ref 150–450)
RBC # BLD AUTO: 5.19 10E6/UL (ref 4.4–5.9)
WBC # BLD AUTO: 5.5 10E3/UL (ref 4–11)

## 2024-03-26 PROCEDURE — 82784 ASSAY IGA/IGD/IGG/IGM EACH: CPT | Performed by: PSYCHIATRY & NEUROLOGY

## 2024-03-26 PROCEDURE — 85025 COMPLETE CBC W/AUTO DIFF WBC: CPT | Performed by: PATHOLOGY

## 2024-03-26 PROCEDURE — 99214 OFFICE O/P EST MOD 30 MIN: CPT | Performed by: PSYCHIATRY & NEUROLOGY

## 2024-03-26 PROCEDURE — 36415 COLL VENOUS BLD VENIPUNCTURE: CPT | Performed by: PATHOLOGY

## 2024-03-26 PROCEDURE — 99000 SPECIMEN HANDLING OFFICE-LAB: CPT | Performed by: PATHOLOGY

## 2024-03-26 PROCEDURE — G0463 HOSPITAL OUTPT CLINIC VISIT: HCPCS | Performed by: PSYCHIATRY & NEUROLOGY

## 2024-03-26 PROCEDURE — 86355 B CELLS TOTAL COUNT: CPT | Performed by: PSYCHIATRY & NEUROLOGY

## 2024-03-26 RX ORDER — ROSUVASTATIN CALCIUM 40 MG/1
1 TABLET, COATED ORAL DAILY
COMMUNITY
Start: 2024-02-15

## 2024-03-26 RX ORDER — MEPERIDINE HYDROCHLORIDE 25 MG/ML
25 INJECTION INTRAMUSCULAR; INTRAVENOUS; SUBCUTANEOUS EVERY 30 MIN PRN
Status: CANCELLED | OUTPATIENT
Start: 2024-08-05

## 2024-03-26 RX ORDER — ALBUTEROL SULFATE 90 UG/1
1-2 AEROSOL, METERED RESPIRATORY (INHALATION)
Status: CANCELLED
Start: 2024-08-05

## 2024-03-26 RX ORDER — DIPHENHYDRAMINE HYDROCHLORIDE 50 MG/ML
50 INJECTION INTRAMUSCULAR; INTRAVENOUS
Status: CANCELLED
Start: 2024-08-05

## 2024-03-26 RX ORDER — HEPARIN SODIUM,PORCINE 10 UNIT/ML
5-20 VIAL (ML) INTRAVENOUS DAILY PRN
Status: CANCELLED | OUTPATIENT
Start: 2024-08-05

## 2024-03-26 RX ORDER — EPINEPHRINE 1 MG/ML
0.3 INJECTION, SOLUTION, CONCENTRATE INTRAVENOUS EVERY 5 MIN PRN
Status: CANCELLED | OUTPATIENT
Start: 2024-08-05

## 2024-03-26 RX ORDER — ACETAMINOPHEN 325 MG/1
650 TABLET ORAL ONCE
Status: CANCELLED | OUTPATIENT
Start: 2024-08-05

## 2024-03-26 RX ORDER — ASCORBIC ACID, THIAMINE, RIBOFLAVIN, NIACINAMIDE, PYRIDOXINE, FOLIC ACID, COBALAMIN, BIOTIN, PANTOTHENIC ACID 100; 1.5; 1.7; 20; 10; 1; 6; 300; 1 MG/1; MG/1; MG/1; MG/1; MG/1; MG/1; UG/1; UG/1; MG/1
1 TABLET, COATED ORAL DAILY
COMMUNITY

## 2024-03-26 RX ORDER — ALBUTEROL SULFATE 0.83 MG/ML
2.5 SOLUTION RESPIRATORY (INHALATION)
Status: CANCELLED | OUTPATIENT
Start: 2024-08-05

## 2024-03-26 RX ORDER — METHYLPREDNISOLONE SODIUM SUCCINATE 125 MG/2ML
125 INJECTION, POWDER, LYOPHILIZED, FOR SOLUTION INTRAMUSCULAR; INTRAVENOUS
Status: CANCELLED
Start: 2024-08-05

## 2024-03-26 RX ORDER — METHYLPREDNISOLONE SODIUM SUCCINATE 125 MG/2ML
125 INJECTION, POWDER, LYOPHILIZED, FOR SOLUTION INTRAMUSCULAR; INTRAVENOUS ONCE
Status: CANCELLED | OUTPATIENT
Start: 2024-08-05

## 2024-03-26 RX ORDER — DIPHENHYDRAMINE HCL 25 MG
50 CAPSULE ORAL ONCE
Status: CANCELLED | OUTPATIENT
Start: 2024-08-05

## 2024-03-26 RX ORDER — HEPARIN SODIUM (PORCINE) LOCK FLUSH IV SOLN 100 UNIT/ML 100 UNIT/ML
5 SOLUTION INTRAVENOUS
Status: CANCELLED | OUTPATIENT
Start: 2024-08-05

## 2024-03-26 ASSESSMENT — PAIN SCALES - GENERAL: PAINLEVEL: NO PAIN (0)

## 2024-03-26 NOTE — PROGRESS NOTES
Date of Service: 3/26/2024    [facility] Neurology   MS Clinic Evaluation    Subjective: 63 y/o man who presents in follow up for MS     PMH:   RLS   Mitral valve repair     Was doing very well after first ocrevus infusion. [place] like he had dialed back his ability to walk by a couple years. Working with PT. Gaining strength and stability.  Persisted until about 2-3 weeks after the second half dose.  Then suffered a decline. Decline was rather subacute and became very fatigued.      Now feels that he has stabilized but gait not nearly as stable as the days after ocrevus.     No new symptoms.     Does acknowledge that he was more active in the time that he was feeling good - though not to an extreme degree.      Tolerated both ocrevus infusions without substantial side effects.     Disease onset: ?age 48, episode of gait instability/ataxia during cardiac rehab   ?age 51, recurrent episode of gait instability      DMD hx:   Glatiramer acetate 5/2012-2/2024, progression of disease  Ocrevus 2/14/24-present, LD 2/28/24    No Known Allergies    Current Outpatient Medications   Medication    aspirin 81 MG EC tablet    B Complex-C-Folic Acid (DIALYVITE) TABS    baclofen (LIORESAL) 10 MG tablet    Cholecalciferol (VITAMIN D3) 25 MCG (1000 UT) CAPS    fish oil-omega-3 fatty acids 1000 MG capsule    magnesium oxide (MAG-OX) 400 MG tablet    naproxen (NAPROSYN) 500 MG tablet    rosuvastatin (CRESTOR) 40 MG tablet    sildenafil (VIAGRA) 100 MG tablet    tamsulosin (FLOMAX) 0.4 MG capsule    Vitamin B Complex-C CAPS    atorvastatin (LIPITOR) 40 MG tablet     No current facility-administered medications for this visit.        Past medical, surgical, social and family history was personally reviewed. Pertinent details noted above.     Physical Examination:   Wt 121.3 kg (267 lb 8 oz)   BMI 33.21 kg/m      General: no acute distress  Cranial nerves:   VFFC  PERRL w/no RAPD  EOM full w/no FARZANA   Face symmetric  Hearing intact  No  dysarthria   Motor:   Tone is normal   Bulk is normal     R L  Deltoid  5 5  Biceps  5 5  Triceps 5 5  Wrist ext 5 5  Finger ext 5 5  Finger abd 5 5    Hip flexion 5- 4+  Knee flexion 5 5-  Knee ext 5 5  Ankle d/f 5 5-    Reflexes: 2+ and symmetric throughout, babinski absent bilaterally  Sensory: vibration is mildly reduced in the ankles (L>R)  Romberg is absent  Coordination: no ataxia or dysmetria  Gait: normal base and stride, tandem gait is intact, able to balance on one foot and hop x 5 bilaterally      Tests/Imagin+ ocb in csf    Vitamin D 61  JCV Ab 0.64    Abs Lymph 2400  igg 1120    MRI brain    - mild/mod periventricular lesion burden, gd-  2023 - no definite new lesions, gd-      MRI cervical spine    - no definite lesions though study not of the best quality   2023 - no definite new lesions    MRI Thoracic spine    - report indicates lesion T11-12, conus  2023 - no definite lesions    Assessment: 63 y/o man with chronic multiple sclerosis. He is now s/p ocrevus. Discussed how improvement that was observed was due to the effect of steroids.  We reviewed the concept of energy conservation.      Given that he had such a robust response to steroids, it would be reasonable to give him a steroid burst before important activities.     Plan:   - blood work today   - continue ocrevus 600 mg every 6 months   - follow up in 6 months    Note was completed with the assistance of Dragon Fluency software which can often result in accidental word substitutions.     A total of 30 minutes on the date of service were spent in the care of this patient.   Kia Menezes MD on 3/26/2024 at 9:27 AM

## 2024-03-26 NOTE — LETTER
3/26/2024       RE: Miguel Stovall  [address] [id number]       Dear Colleague,    Thank you for referring your patient, Miguel Stovall, to the [facility] [place] [facility]. Please see a copy of my visit note below.    Date of Service: 3/26/2024    [facility] Neurology   MS Clinic Evaluation    Subjective: 65 y/o man who presents in follow up for MS     PMH:   RLS   Mitral valve repair     Was doing very well after first ocrevus infusion. [place] like he had dialed back his ability to walk by a couple years. Working with PT. Gaining strength and stability.  Persisted until about 2-3 weeks after the second half dose.  Then suffered a decline. Decline was rather subacute and became very fatigued.      Now feels that he has stabilized but gait not nearly as stable as the days after ocrevus.     No new symptoms.     Does acknowledge that he was more active in the time that he was feeling good - though not to an extreme degree.      Tolerated both ocrevus infusions without substantial side effects.     Disease onset: ?age 48, episode of gait instability/ataxia during cardiac rehab   ?age 51, recurrent episode of gait instability      DMD hx:   Glatiramer acetate 5/2012-2/2024, progression of disease  Ocrevus 2/14/24-present, LD 2/28/24    No Known Allergies    Current Outpatient Medications   Medication    aspirin 81 MG EC tablet    B Complex-C-Folic Acid (DIALYVITE) TABS    baclofen (LIORESAL) 10 MG tablet    Cholecalciferol (VITAMIN D3) 25 MCG (1000 UT) CAPS    fish oil-omega-3 fatty acids 1000 MG capsule    magnesium oxide (MAG-OX) 400 MG tablet    naproxen (NAPROSYN) 500 MG tablet    rosuvastatin (CRESTOR) 40 MG tablet    sildenafil (VIAGRA) 100 MG tablet    tamsulosin (FLOMAX) 0.4 MG capsule    Vitamin B Complex-C CAPS    atorvastatin (LIPITOR) 40 MG tablet     No current facility-administered medications for this  visit.        Past medical, surgical, social and family history was personally reviewed. Pertinent details noted above.     Physical Examination:   Wt 121.3 kg (267 lb 8 oz)   BMI 33.21 kg/m      General: no acute distress  Cranial nerves:   VFFC  PERRL w/no RAPD  EOM full w/no FARZANA   Face symmetric  Hearing intact  No dysarthria   Motor:   Tone is normal   Bulk is normal     R L  Deltoid  5 5  Biceps  5 5  Triceps 5 5  Wrist ext 5 5  Finger ext 5 5  Finger abd 5 5    Hip flexion 5- 4+  Knee flexion 5 5-  Knee ext 5 5  Ankle d/f 5 5-    Reflexes: 2+ and symmetric throughout, babinski absent bilaterally  Sensory: vibration is mildly reduced in the ankles (L>R)  Romberg is absent  Coordination: no ataxia or dysmetria  Gait: normal base and stride, tandem gait is intact, able to balance on one foot and hop x 5 bilaterally      Tests/Imagin+ ocb in csf    Vitamin D 61  JCV Ab 0.64    Abs Lymph 2400  igg 1120    MRI brain    - mild/mod periventricular lesion burden, gd-  2023 - no definite new lesions, gd-      MRI cervical spine    - no definite lesions though study not of the best quality   2023 - no definite new lesions    MRI Thoracic spine    - report indicates lesion T11-12, conus  2023 - no definite lesions    Assessment: 63 y/o man with chronic multiple sclerosis. He is now s/p ocrevus. Discussed how improvement that was observed was due to the effect of steroids.  We reviewed the concept of energy conservation.      Given that he had such a robust response to steroids, it would be reasonable to give him a steroid burst before important activities.     Plan:   - blood work today   - continue ocrevus 600 mg every 6 months   - follow up in 6 months    Note was completed with the assistance of Dragon Fluency software which can often result in accidental word substitutions.     A total of 30 minutes on the date of service were spent in the care of this patient.   Kia Menezes MD  on 3/26/2024 at 9:27 AM          Again, thank you for allowing me to participate in the care of your patient.      Sincerely,    Kia Menezes MD

## 2024-03-26 NOTE — NURSING NOTE
Chief Complaint   Patient presents with    MS    RECHECK     3-4 month follow up      Vitals were taken and medications were reconciled.   Shine Mcpherson, EMT  9:26 AM

## 2024-03-26 NOTE — PATIENT INSTRUCTIONS
You are doing well     The ocrevus will start working better in the next month     Blood work today     Orders for full dose ocrevus placed today     Follow up in 6 months

## 2024-03-27 LAB — IGG SERPL-MCNC: 989 MG/DL (ref 610–1616)

## 2024-08-09 ENCOUNTER — OFFICE VISIT (OUTPATIENT)
Dept: NEUROLOGY | Facility: CLINIC | Age: 64
End: 2024-08-09
Attending: PSYCHIATRY & NEUROLOGY
Payer: COMMERCIAL

## 2024-08-09 ENCOUNTER — LAB (OUTPATIENT)
Dept: LAB | Facility: CLINIC | Age: 64
End: 2024-08-09
Payer: COMMERCIAL

## 2024-08-09 VITALS
BODY MASS INDEX: 33.19 KG/M2 | DIASTOLIC BLOOD PRESSURE: 84 MMHG | HEART RATE: 65 BPM | WEIGHT: 267.3 LBS | OXYGEN SATURATION: 97 % | SYSTOLIC BLOOD PRESSURE: 135 MMHG

## 2024-08-09 DIAGNOSIS — R26.81 GAIT INSTABILITY: ICD-10-CM

## 2024-08-09 DIAGNOSIS — Z51.81 THERAPEUTIC DRUG MONITORING: ICD-10-CM

## 2024-08-09 DIAGNOSIS — G35 MS (MULTIPLE SCLEROSIS) (H): ICD-10-CM

## 2024-08-09 DIAGNOSIS — G35 MS (MULTIPLE SCLEROSIS) (H): Primary | ICD-10-CM

## 2024-08-09 LAB
BASOPHILS # BLD AUTO: 0 10E3/UL (ref 0–0.2)
BASOPHILS NFR BLD AUTO: 0 %
CD19 B CELL COMMENT: ABNORMAL
CD19 CELLS # BLD: 28 CELLS/UL (ref 107–698)
CD19 CELLS NFR BLD: 1 % (ref 6–27)
EOSINOPHIL # BLD AUTO: 0.1 10E3/UL (ref 0–0.7)
EOSINOPHIL NFR BLD AUTO: 1 %
ERYTHROCYTE [DISTWIDTH] IN BLOOD BY AUTOMATED COUNT: 13.1 % (ref 10–15)
HCT VFR BLD AUTO: 46.1 % (ref 40–53)
HGB BLD-MCNC: 15.8 G/DL (ref 13.3–17.7)
IMM GRANULOCYTES # BLD: 0 10E3/UL
IMM GRANULOCYTES NFR BLD: 0 %
LYMPHOCYTES # BLD AUTO: 2.1 10E3/UL (ref 0.8–5.3)
LYMPHOCYTES NFR BLD AUTO: 28 %
MCH RBC QN AUTO: 29.6 PG (ref 26.5–33)
MCHC RBC AUTO-ENTMCNC: 34.3 G/DL (ref 31.5–36.5)
MCV RBC AUTO: 86 FL (ref 78–100)
MONOCYTES # BLD AUTO: 0.8 10E3/UL (ref 0–1.3)
MONOCYTES NFR BLD AUTO: 11 %
NEUTROPHILS # BLD AUTO: 4.5 10E3/UL (ref 1.6–8.3)
NEUTROPHILS NFR BLD AUTO: 60 %
NRBC # BLD AUTO: 0 10E3/UL
NRBC BLD AUTO-RTO: 0 /100
PLATELET # BLD AUTO: 205 10E3/UL (ref 150–450)
RBC # BLD AUTO: 5.34 10E6/UL (ref 4.4–5.9)
WBC # BLD AUTO: 7.4 10E3/UL (ref 4–11)

## 2024-08-09 PROCEDURE — 86355 B CELLS TOTAL COUNT: CPT | Performed by: PSYCHIATRY & NEUROLOGY

## 2024-08-09 PROCEDURE — G0463 HOSPITAL OUTPT CLINIC VISIT: HCPCS | Performed by: PSYCHIATRY & NEUROLOGY

## 2024-08-09 PROCEDURE — 99000 SPECIMEN HANDLING OFFICE-LAB: CPT | Performed by: PATHOLOGY

## 2024-08-09 PROCEDURE — 99214 OFFICE O/P EST MOD 30 MIN: CPT | Performed by: PSYCHIATRY & NEUROLOGY

## 2024-08-09 PROCEDURE — 82784 ASSAY IGA/IGD/IGG/IGM EACH: CPT | Performed by: PSYCHIATRY & NEUROLOGY

## 2024-08-09 PROCEDURE — 36415 COLL VENOUS BLD VENIPUNCTURE: CPT | Performed by: PATHOLOGY

## 2024-08-09 PROCEDURE — 85025 COMPLETE CBC W/AUTO DIFF WBC: CPT | Performed by: PATHOLOGY

## 2024-08-09 RX ORDER — METHYLPREDNISOLONE 4 MG
TABLET, DOSE PACK ORAL
Qty: 21 TABLET | Refills: 0 | Status: SHIPPED | OUTPATIENT
Start: 2024-08-09

## 2024-08-09 ASSESSMENT — PAIN SCALES - GENERAL: PAINLEVEL: NO PAIN (0)

## 2024-08-09 NOTE — PROGRESS NOTES
Date of Service: 8/9/2024    [facility] Neurology   MS Clinic Evaluation    Subjective: 63 y/o man who presents in follow up for MS     PMH:   RLS   Mitral valve repair   LITTLE compliant with CPAP     She reports today started to struggle with increased fatigue.  He started falling asleep morning or afternoon.  Particularly he was at risk of falling asleep when driving.  He followed up with sleep medicine.  In reviewing his CPAP it appears that he is compliant with his CPAP, he is getting 9 hours of sleep at night and has very rare apneic events.  He was not struggling with nocturia.    He reports some word finding difficulties that has gotten worse.    Earlier this week he had a few days of balance impairment with increased nocturia.  He has also noticed an increase in leg cramps.    He does have his next dose of Ocrevus scheduled on August 28.    Disease onset: ?age 48, episode of gait instability/ataxia during cardiac rehab   ?age 51, recurrent episode of gait instability      DMD hx:   Glatiramer acetate 5/2012-2/2024, progression of disease  Ocrevus 2/14/24-present, LD 2/28/24    No Known Allergies    Current Outpatient Medications   Medication Sig Dispense Refill    aspirin 81 MG EC tablet Take 1 tablet by mouth daily      B Complex-C-Folic Acid (DIALYVITE) TABS Take 1 tablet by mouth daily      baclofen (LIORESAL) 10 MG tablet Take 1.5 tablets (15 mg) by mouth 3 times daily 405 tablet 3    Cholecalciferol (VITAMIN D3) 25 MCG (1000 UT) CAPS Takes 10,000 3 times a week and 5000 4 times per week      fish oil-omega-3 fatty acids 1000 MG capsule Take 2 g by mouth daily      magnesium oxide (MAG-OX) 400 MG tablet Take 400 mg by mouth      naproxen (NAPROSYN) 500 MG tablet       rosuvastatin (CRESTOR) 40 MG tablet Take 1 tablet by mouth daily      sildenafil (VIAGRA) 100 MG tablet Take 100 mg by mouth      tamsulosin (FLOMAX) 0.4 MG capsule       Vitamin B Complex-C CAPS Take 1 capsule by mouth      atorvastatin  (LIPITOR) 40 MG tablet Take 40 mg by mouth at bedtime (Patient not taking: Reported on 3/26/2024)       No current facility-administered medications for this visit.        Past medical, surgical, social and family history was personally reviewed. Pertinent details noted above.     Physical Examination:   /84 (BP Location: Left arm, Patient Position: Sitting, Cuff Size: Adult Regular)   Pulse 65   Wt 121.2 kg (267 lb 4.8 oz)   SpO2 97%   BMI 33.19 kg/m      General: no acute distress  Cranial nerves:   VFFC  PERRL w/no RAPD  EOM full w/no FARZANA   Face symmetric  Hearing intact  No dysarthria   Motor:   Tone is normal   Bulk is normal     R L  Deltoid  5 5  Biceps  5 5  Triceps  5 5  Wrist ext 5 5  Finger ext 5 5  Finger abd 5 5    Hip flexion 5- 4+  Knee flexion 5 5-  Knee ext 5 5  Ankle d/f 5 5-    Reflexes: 2+ and symmetric throughout, babinski absent bilaterally  Sensory: vibration is mild/mod reduced in the toes, mildly reduced JPS  Romberg is present  Coordination: no ataxia or dysmetria  Gait: normal base and stride, tandem gait is mildly impaired, able to balance on one foot and hop x 5 bilaterally      Tests/Imagin+ ocb in csf    Vitamin D 61  JCV Ab 0.64    Abs Lymph 2400  igg 1120    MRI brain    - mild/mod periventricular lesion burden, gd-  2023 - no definite new lesions, gd-      MRI cervical spine    - no definite lesions though study not of the best quality   2023 - no definite new lesions    MRI Thoracic spine    - report indicates lesion T11-12, conus  2023 - no definite lesions    Assessment: 63 y/o man with chronic multiple sclerosis.  He is reporting a decline in balance.  This is confirmed on clinical examination today.  We discussed that this likely is a wearing off phenomenon of Ocrevus.  I recommended that he undergo blood work to assess return of B cells.    I have also asked him to undergo an updated MRI.  If there is a new lesion then I would advise that  we reduce the interval for Ocrevus to every 5 months rather than every 6 months.  However, he will have to keep his upcoming infusion as scheduled.    He has previously experienced steroid withdrawal after his infusion.  I have therefore prescribed a Medrol Dosepak for him to taper off after his infusion.    Plan:   -Blood work today  - Update MRI  - Follow-up as scheduled    Note was completed with the assistance of Dragon [employer] software which can often result in accidental word substitutions.     A total of 30 minutes on the date of service were spent in the care of this patient.   Kia Menezes MD on 8/9/2024 at 1:43 PM

## 2024-08-09 NOTE — LETTER
8/9/2024       RE: Miguel Stovall  [address] [facility] [address]     Dear Colleague,    Thank you for referring your patient, Miguel Stovall, to the [facility] MULTIPLE SCLEROSIS CLINIC [place] at [facility]. Please see a copy of my visit note below.    Date of Service: 8/9/2024    [facility] Neurology   MS Clinic Evaluation    Subjective: 65 y/o man who presents in follow up for MS     PMH:   RLS   Mitral valve repair   LITTLE compliant with CPAP     She reports today started to struggle with increased fatigue.  He started falling asleep morning or afternoon.  Particularly he was at risk of falling asleep when driving.  He followed up with sleep medicine.  In reviewing his CPAP it appears that he is compliant with his CPAP, he is getting 9 hours of sleep at night and has very rare apneic events.  He was not struggling with nocturia.    He reports some word finding difficulties that has gotten worse.    Earlier this week he had a few days of balance impairment with increased nocturia.  He has also noticed an increase in leg cramps.    He does have his next dose of Ocrevus scheduled on August 28.    Disease onset: ?age 48, episode of gait instability/ataxia during cardiac rehab   ?age 51, recurrent episode of gait instability      DMD hx:   Glatiramer acetate 5/2012-2/2024, progression of disease  Ocrevus 2/14/24-present, LD 2/28/24    No Known Allergies    Current Outpatient Medications   Medication Sig Dispense Refill     aspirin 81 MG EC tablet Take 1 tablet by mouth daily       B Complex-C-Folic Acid (DIALYVITE) TABS Take 1 tablet by mouth daily       baclofen (LIORESAL) 10 MG tablet Take 1.5 tablets (15 mg) by mouth 3 times daily 405 tablet 3     Cholecalciferol (VITAMIN D3) 25 MCG (1000 UT) CAPS Takes 10,000 3 times a week and 5000 4 times per week       fish oil-omega-3 fatty acids 1000 MG capsule Take 2 g by mouth daily       magnesium oxide  (MAG-OX) 400 MG tablet Take 400 mg by mouth       naproxen (NAPROSYN) 500 MG tablet        rosuvastatin (CRESTOR) 40 MG tablet Take 1 tablet by mouth daily       sildenafil (VIAGRA) 100 MG tablet Take 100 mg by mouth       tamsulosin (FLOMAX) 0.4 MG capsule        Vitamin B Complex-C CAPS Take 1 capsule by mouth       atorvastatin (LIPITOR) 40 MG tablet Take 40 mg by mouth at bedtime (Patient not taking: Reported on 3/26/2024)       No current facility-administered medications for this visit.        Past medical, surgical, social and family history was personally reviewed. Pertinent details noted above.     Physical Examination:   /84 (BP Location: Left arm, Patient Position: Sitting, Cuff Size: Adult Regular)   Pulse 65   Wt 121.2 kg (267 lb 4.8 oz)   SpO2 97%   BMI 33.19 kg/m      General: no acute distress  Cranial nerves:   VFFC  PERRL w/no RAPD  EOM full w/no FARZANA   Face symmetric  Hearing intact  No dysarthria   Motor:   Tone is normal   Bulk is normal     R L  Deltoid  5 5  Biceps  5 5  Triceps  5 5  Wrist ext 5 5  Finger ext 5 5  Finger abd 5 5    Hip flexion 5- 4+  Knee flexion 5 5-  Knee ext 5 5  Ankle d/f 5 5-    Reflexes: 2+ and symmetric throughout, babinski absent bilaterally  Sensory: vibration is mild/mod reduced in the toes, mildly reduced JPS  Romberg is present  Coordination: no ataxia or dysmetria  Gait: normal base and stride, tandem gait is mildly impaired, able to balance on one foot and hop x 5 bilaterally      Tests/Imagin+ ocb in csf    Vitamin D 61  JCV Ab 0.64    Abs Lymph 2400  igg 1120    MRI brain    - mild/mod periventricular lesion burden, gd-  2023 - no definite new lesions, gd-      MRI cervical spine    - no definite lesions though study not of the best quality   2023 - no definite new lesions    MRI Thoracic spine    - report indicates lesion T11-12, conus  2023 - no definite lesions    Assessment: 65 y/o man with chronic multiple sclerosis.   He is reporting a decline in balance.  This is confirmed on clinical examination today.  We discussed that this likely is a wearing off phenomenon of Ocrevus.  I recommended that he undergo blood work to assess return of B cells.    I have also asked him to undergo an updated MRI.  If there is a new lesion then I would advise that we reduce the interval for Ocrevus to every 5 months rather than every 6 months.  However, he will have to keep his upcoming infusion as scheduled.    He has previously experienced steroid withdrawal after his infusion.  I have therefore prescribed a Medrol Dosepak for him to taper off after his infusion.    Plan:   -Blood work today  - Update MRI  - Follow-up as scheduled    Note was completed with the assistance of Dragon Fluency software which can often result in accidental word substitutions.     A total of 30 minutes on the date of service were spent in the care of this patient.   Kia Menezes MD on 8/9/2024 at 1:43 PM          Again, thank you for allowing me to participate in the care of your patient.      Sincerely,    Kia Menezes MD

## 2024-08-09 NOTE — NURSING NOTE
Chief Complaint   Patient presents with    MS    RECHECK     MS follow up      Vitals were taken and medications were reconciled.   Shine Mcpherson, EMT  1:24 PM

## 2024-08-09 NOTE — PATIENT INSTRUCTIONS
Your balance is worse     Blood work today to see if ocrevus is wearing off (standing orders)     Update MRI - ordered stat because I would prefer it be done before your next ocrevus dose     I did send an order for a medrol dose pack for you to start after your infusion     Follow up as scheduled

## 2024-08-12 LAB — IGG SERPL-MCNC: 1037 MG/DL (ref 610–1616)

## 2024-08-15 ENCOUNTER — HOSPITAL ENCOUNTER (OUTPATIENT)
Dept: MRI IMAGING | Facility: CLINIC | Age: 64
Discharge: HOME | End: 2024-08-15
Attending: PSYCHIATRY & NEUROLOGY | Admitting: PSYCHIATRY & NEUROLOGY
Payer: COMMERCIAL

## 2024-08-15 DIAGNOSIS — G35 MS (MULTIPLE SCLEROSIS) (H): ICD-10-CM

## 2024-08-15 PROCEDURE — 255N000002 HC RX 255 OP 636: Performed by: PSYCHIATRY & NEUROLOGY

## 2024-08-15 PROCEDURE — A9585 GADOBUTROL INJECTION: HCPCS | Performed by: PSYCHIATRY & NEUROLOGY

## 2024-08-15 PROCEDURE — 72156 MRI NECK SPINE W/O & W/DYE: CPT

## 2024-08-15 PROCEDURE — 70553 MRI BRAIN STEM W/O & W/DYE: CPT

## 2024-08-15 PROCEDURE — 72157 MRI CHEST SPINE W/O & W/DYE: CPT

## 2024-08-15 RX ORDER — GADOBUTROL 604.72 MG/ML
12 INJECTION INTRAVENOUS ONCE
Status: COMPLETED | OUTPATIENT
Start: 2024-08-15 | End: 2024-08-15

## 2024-08-15 RX ADMIN — GADOBUTROL 12 ML: 604.72 INJECTION INTRAVENOUS at 18:44

## 2024-08-16 ENCOUNTER — MYC MEDICAL ADVICE (OUTPATIENT)
Dept: NEUROLOGY | Facility: CLINIC | Age: 64
End: 2024-08-16
Payer: COMMERCIAL

## 2024-08-16 DIAGNOSIS — G35 MS (MULTIPLE SCLEROSIS) (H): Primary | ICD-10-CM

## 2024-08-16 RX ORDER — PREDNISONE 20 MG/1
TABLET ORAL
Qty: 25 TABLET | Refills: 0 | Status: SHIPPED | OUTPATIENT
Start: 2024-08-16 | End: 2024-08-23

## 2024-08-28 ENCOUNTER — INFUSION THERAPY VISIT (OUTPATIENT)
Dept: INFUSION THERAPY | Facility: CLINIC | Age: 64
End: 2024-08-28
Attending: NURSE PRACTITIONER
Payer: COMMERCIAL

## 2024-08-28 VITALS
SYSTOLIC BLOOD PRESSURE: 108 MMHG | OXYGEN SATURATION: 94 % | WEIGHT: 264.3 LBS | BODY MASS INDEX: 32.82 KG/M2 | DIASTOLIC BLOOD PRESSURE: 71 MMHG | HEART RATE: 72 BPM | RESPIRATION RATE: 16 BRPM | TEMPERATURE: 98.1 F

## 2024-08-28 DIAGNOSIS — G35 MS (MULTIPLE SCLEROSIS) (H): Primary | ICD-10-CM

## 2024-08-28 PROCEDURE — 250N000011 HC RX IP 250 OP 636: Performed by: PSYCHIATRY & NEUROLOGY

## 2024-08-28 PROCEDURE — 99207 PR NO CHARGE LOS: CPT

## 2024-08-28 PROCEDURE — 96375 TX/PRO/DX INJ NEW DRUG ADDON: CPT

## 2024-08-28 PROCEDURE — 250N000013 HC RX MED GY IP 250 OP 250 PS 637: Performed by: PSYCHIATRY & NEUROLOGY

## 2024-08-28 PROCEDURE — 96365 THER/PROPH/DIAG IV INF INIT: CPT

## 2024-08-28 PROCEDURE — 258N000003 HC RX IP 258 OP 636: Performed by: PSYCHIATRY & NEUROLOGY

## 2024-08-28 PROCEDURE — 96366 THER/PROPH/DIAG IV INF ADDON: CPT

## 2024-08-28 RX ORDER — DIPHENHYDRAMINE HYDROCHLORIDE 50 MG/ML
50 INJECTION INTRAMUSCULAR; INTRAVENOUS
Start: 2025-02-24

## 2024-08-28 RX ORDER — DIPHENHYDRAMINE HCL 25 MG
50 CAPSULE ORAL ONCE
OUTPATIENT
Start: 2025-02-24

## 2024-08-28 RX ORDER — METHYLPREDNISOLONE SODIUM SUCCINATE 125 MG/2ML
125 INJECTION, POWDER, LYOPHILIZED, FOR SOLUTION INTRAMUSCULAR; INTRAVENOUS ONCE
Status: COMPLETED | OUTPATIENT
Start: 2024-08-28 | End: 2024-08-28

## 2024-08-28 RX ORDER — MEPERIDINE HYDROCHLORIDE 25 MG/ML
25 INJECTION INTRAMUSCULAR; INTRAVENOUS; SUBCUTANEOUS EVERY 30 MIN PRN
OUTPATIENT
Start: 2025-02-24

## 2024-08-28 RX ORDER — EPINEPHRINE 1 MG/ML
0.3 INJECTION, SOLUTION INTRAMUSCULAR; SUBCUTANEOUS EVERY 5 MIN PRN
OUTPATIENT
Start: 2025-02-24

## 2024-08-28 RX ORDER — METHYLPREDNISOLONE SODIUM SUCCINATE 125 MG/2ML
125 INJECTION, POWDER, LYOPHILIZED, FOR SOLUTION INTRAMUSCULAR; INTRAVENOUS ONCE
OUTPATIENT
Start: 2025-02-24

## 2024-08-28 RX ORDER — ACETAMINOPHEN 325 MG/1
650 TABLET ORAL ONCE
OUTPATIENT
Start: 2025-02-24

## 2024-08-28 RX ORDER — HEPARIN SODIUM,PORCINE 10 UNIT/ML
5-20 VIAL (ML) INTRAVENOUS DAILY PRN
OUTPATIENT
Start: 2025-02-24

## 2024-08-28 RX ORDER — ALBUTEROL SULFATE 90 UG/1
1-2 AEROSOL, METERED RESPIRATORY (INHALATION)
Start: 2025-02-24

## 2024-08-28 RX ORDER — METHYLPREDNISOLONE SODIUM SUCCINATE 125 MG/2ML
125 INJECTION, POWDER, LYOPHILIZED, FOR SOLUTION INTRAMUSCULAR; INTRAVENOUS
Start: 2025-02-24

## 2024-08-28 RX ORDER — ACETAMINOPHEN 325 MG/1
650 TABLET ORAL ONCE
Status: COMPLETED | OUTPATIENT
Start: 2024-08-28 | End: 2024-08-28

## 2024-08-28 RX ORDER — DIPHENHYDRAMINE HCL 25 MG
50 CAPSULE ORAL ONCE
Status: COMPLETED | OUTPATIENT
Start: 2024-08-28 | End: 2024-08-28

## 2024-08-28 RX ORDER — ALBUTEROL SULFATE 0.83 MG/ML
2.5 SOLUTION RESPIRATORY (INHALATION)
OUTPATIENT
Start: 2025-02-24

## 2024-08-28 RX ORDER — HEPARIN SODIUM (PORCINE) LOCK FLUSH IV SOLN 100 UNIT/ML 100 UNIT/ML
5 SOLUTION INTRAVENOUS
OUTPATIENT
Start: 2025-02-24

## 2024-08-28 RX ADMIN — ACETAMINOPHEN 650 MG: 325 TABLET ORAL at 08:58

## 2024-08-28 RX ADMIN — SODIUM CHLORIDE 250 ML: 9 INJECTION, SOLUTION INTRAVENOUS at 09:03

## 2024-08-28 RX ADMIN — METHYLPREDNISOLONE SODIUM SUCCINATE 125 MG: 125 INJECTION, POWDER, FOR SOLUTION INTRAMUSCULAR; INTRAVENOUS at 09:03

## 2024-08-28 RX ADMIN — OCRELIZUMAB 600 MG: 300 INJECTION INTRAVENOUS at 09:15

## 2024-08-28 RX ADMIN — DIPHENHYDRAMINE HYDROCHLORIDE 50 MG: 25 CAPSULE ORAL at 08:58

## 2024-08-28 NOTE — PROGRESS NOTES
"Infusion Nursing Note:  Miguel Stovall presents today for every 6 month Ocrevus infusion.    Patient seen by provider today: No   present during visit today: Not Applicable.    Note:   Patient tolerated first two doses without any complications. Thus offered rapid Ocrevus and patient tolerated rapid well today.      Patient states with first two doses of Ocrevus he had lots of energy for two weeks and then \"crashed hard\" after the two weeks. Per Dr Menezes's visit on 8/9/24 rupinder will start a steroid pack tomorrow and taper down to hopefully prevent another crash.     Patient also states he has had a flare for the past month. Thus Dr Menezes's office is working on new orders, prior auth, etc for infusions to be every 5 months instead of 6.    Intravenous Access:  Peripheral IV placed.    Treatment Conditions:  Biological Infusion Checklist:  ~~~ NOTE: If the patient answers yes to any of the questions below, hold the infusion and contact ordering provider or on-call provider.    Have you recently had an elevated temperature, fever, chills, productive cough, coughing for 3 weeks or longer or hemoptysis,  abnormal vital signs, night sweats,  chest pain or have you noticed a decrease in your appetite, unexplained weight loss or fatigue? No  Do you have any open wounds or new incisions? No  Do you have any upcoming hospitalizations or surgeries? Does not include esophagogastroduodenoscopy, colonoscopy, endoscopic retrograde cholangiopancreatography (ERCP), endoscopic ultrasound (EUS), dental procedures or joint aspiration/steroid injections No  Do you currently have any signs of illness or infection or are you on any antibiotics? No  Have you had any new, sudden or worsening abdominal pain? No  Have you or anyone in your household received a live vaccination in the past 4 weeks? Please note: No live vaccines while on biologic/chemotherapy until 6 months after the last treatment. Patient can receive the flu " vaccine (shot only), pneumovax and the Covid vaccine. It is optimal for the patient to get these vaccines mid cycle, but they can be given at any time as long as it is not on the day of the infusion. No  Have you recently been diagnosed with any new nervous system diseases (ie. Multiple sclerosis, Guillain [place], seizures, neurological changes) or cancer diagnosis? Are you on any form of radiation or chemotherapy? No  Have there been any other new onset medical symptoms? No    Post Infusion Assessment:  Patient tolerated infusion without incident.  Patient observed for 60 minutes post Ocrevus per protocol.  Blood return noted pre and post infusion.  Site patent and intact, free from redness, edema or discomfort.  No evidence of extravasations.  Access discontinued per protocol.       Discharge Plan:   AVS to patient via MYCHART.  Patient will return in 5 months for next appointment. Although patient may be out of town and have next infusion at 5.5 months.  Patient discharged in stable condition accompanied by: wife.  Departure Mode: Ambulatory.      Tri Reeves RN

## 2024-09-07 ENCOUNTER — MYC MEDICAL ADVICE (OUTPATIENT)
Dept: NEUROLOGY | Facility: CLINIC | Age: 64
End: 2024-09-07
Payer: COMMERCIAL

## 2024-09-07 DIAGNOSIS — G25.81 RESTLESS LEGS SYNDROME (RLS): Primary | ICD-10-CM

## 2024-09-10 RX ORDER — GABAPENTIN 300 MG/1
300-600 CAPSULE ORAL AT BEDTIME
Qty: 60 CAPSULE | Refills: 5 | Status: SHIPPED | OUTPATIENT
Start: 2024-09-10

## 2024-09-25 ENCOUNTER — OFFICE VISIT (OUTPATIENT)
Dept: NEUROLOGY | Facility: CLINIC | Age: 64
End: 2024-09-25
Attending: PSYCHIATRY & NEUROLOGY
Payer: COMMERCIAL

## 2024-09-25 VITALS
OXYGEN SATURATION: 96 % | WEIGHT: 258.7 LBS | SYSTOLIC BLOOD PRESSURE: 116 MMHG | BODY MASS INDEX: 32.12 KG/M2 | DIASTOLIC BLOOD PRESSURE: 78 MMHG | HEART RATE: 75 BPM

## 2024-09-25 DIAGNOSIS — G47.33 OSA (OBSTRUCTIVE SLEEP APNEA): ICD-10-CM

## 2024-09-25 DIAGNOSIS — R26.81 GAIT INSTABILITY: ICD-10-CM

## 2024-09-25 DIAGNOSIS — G35 MS (MULTIPLE SCLEROSIS) (H): Primary | ICD-10-CM

## 2024-09-25 DIAGNOSIS — Z51.81 THERAPEUTIC DRUG MONITORING: ICD-10-CM

## 2024-09-25 DIAGNOSIS — G47.00 PERSISTENT DISORDER OF INITIATING OR MAINTAINING SLEEP: ICD-10-CM

## 2024-09-25 DIAGNOSIS — G25.81 RESTLESS LEG SYNDROME: ICD-10-CM

## 2024-09-25 PROCEDURE — G0463 HOSPITAL OUTPT CLINIC VISIT: HCPCS | Performed by: PSYCHIATRY & NEUROLOGY

## 2024-09-25 PROCEDURE — 99215 OFFICE O/P EST HI 40 MIN: CPT | Performed by: PSYCHIATRY & NEUROLOGY

## 2024-09-25 ASSESSMENT — PAIN SCALES - GENERAL: PAINLEVEL: NO PAIN (0)

## 2024-09-25 NOTE — NURSING NOTE
Chief Complaint   Patient presents with    MS    RECHECK     6 month follow up      Vitals were taken and medications were reconciled.    Shine Mcpherson, EMT  9:20 AM

## 2024-09-25 NOTE — PATIENT INSTRUCTIONS
Give the gabapentin a try     Sleep referral     Plan to continue ocrevus   Blood work on day of infusion     Schedule a memory evaluation     Follow up in 5 months

## 2024-09-25 NOTE — PROGRESS NOTES
Date of Service: 9/25/2024    [facility] Neurology   MS Clinic Evaluation    Subjective: 63 y/o man who presents in follow up for MS     PMH:   RLS   Mitral valve repair   LITTLE compliant with CPAP     No discrete new symptoms reported today.    Recall that at his last visit he was complaining of worsening word finding, nocturia, balance impairment, leg cramps.  This is in the setting of return of B cells prior to his Ocrevus infusion.  MRI was noted to be negative for any new lesions.  I did give him a steroid burst before his infusion.  This resulted in some improvement in balance and improvement in his bowel movement pattern, though he did not get as much improvement as he has received with past burst of steroids.  He tolerated his Ocrevus infusion well without any adverse effects.  He then had a Medrol Dosepak after his infusion found this helpful and he did not experience the same fatigue that he experienced previously.    He is quite concerned as he continues to struggle with sleep.  This is been an ongoing challenge.  He struggles with significant daytime somnolence.  He did undergo a sleep study and reports that he was only noted to get a couple hours of restful sleep.  It seems that he was told that he has restless legs and was advised to try a therapy for this.  He has some hesitations about trying gabapentin, which were addressed today.    Disease onset: ?age 48, episode of gait instability/ataxia during cardiac rehab   ?age 51, recurrent episode of gait instability      DMD hx:   Glatiramer acetate 5/2012-2/2024, progression of disease  Ocrevus 2/14/24-present, LD 8/28/24    No Known Allergies    Current Outpatient Medications   Medication Sig Dispense Refill    aspirin 81 MG EC tablet Take 1 tablet by mouth daily      B Complex-C-Folic Acid (DIALYVITE) TABS Take 1 tablet by mouth daily      baclofen (LIORESAL) 10 MG tablet Take 1.5 tablets (15 mg) by mouth 3 times daily 405 tablet 3    Cholecalciferol  (VITAMIN D3) 25 MCG (1000 UT) CAPS Takes 10,000 3 times a week and 5000 4 times per week      fish oil-omega-3 fatty acids 1000 MG capsule Take 2 g by mouth daily      gabapentin (NEURONTIN) 300 MG capsule Take 1-2 capsules (300-600 mg) by mouth at bedtime. 60 capsule 5    magnesium oxide (MAG-OX) 400 MG tablet Take 400 mg by mouth      methylPREDNISolone (MEDROL DOSEPAK) 4 MG tablet therapy pack Take all pills for the given day in the morning (6 pills day 1, 5 pills day 2, etc) 21 tablet 0    naproxen (NAPROSYN) 500 MG tablet       rosuvastatin (CRESTOR) 40 MG tablet Take 1 tablet by mouth daily      sildenafil (VIAGRA) 100 MG tablet Take 100 mg by mouth      tamsulosin (FLOMAX) 0.4 MG capsule       Vitamin B Complex-C CAPS Take 1 capsule by mouth       No current facility-administered medications for this visit.        Past medical, surgical, social and family history was personally reviewed. Pertinent details noted above.     Physical Examination:   /78 (BP Location: Left arm, Patient Position: Sitting, Cuff Size: Adult Regular)   Pulse 75   Wt 117.3 kg (258 lb 11.2 oz)   SpO2 96%   BMI 32.12 kg/m      General: no acute distress  Motor:   Tone is normal   Bulk is normal     R L  Hip flexion 5- 5-  Knee flexion 5 5-  Knee ext 5 5  Ankle d/f 5 5-    Reflexes: 2+ and symmetric throughout, babinski absent bilaterally  Sensory: vibration is mild/mod reduced in the toes, mildly reduced JPS  Romberg is present  Coordination: no ataxia or dysmetria  Gait: normal base and stride, tandem gait is mildly impaired with  effort, able to balance on one foot x 10 sec but difficulty hopping      Tests/Imagin+ ocb in csf    Vitamin D 61  JCV Ab 0.64  28  CD19   Abs Lymph 2400-> 2100  igg 1120-> 1037    MRI brain    - mild/mod periventricular lesion burden, gd-  2023 - no definite new lesions, gd-   2024-no new lesions     MRI cervical spine    - no definite lesions though study not of the best quality    12/2023 - no definite new lesions  8/2024 - no new lesions    MRI Thoracic spine   2012 - report indicates lesion T11-12lucio  12/2023 - no definite lesions  8/2024 - no new lesions    Assessment: 63 y/o man with chronic multiple sclerosis.  He is now status post first maintenance dose of Ocrevus.  He tolerated the infusion well.  I recommended that he continue with standard dosing.  Only if his B cells were to increase beyond 50 would I advise reducing the time between infusions.  We discussed how Ocrevus does have a cumulative effect.  Therefore he is not anticipated to have as much worsening of symptoms prior to his next infusion.  He voiced understanding.    We reviewed his MRI brain in detail.  We discussed how it is difficult to estimate how a single lesion will impact cognition.  However there is certainly a cumulative effect of the lesions.  Additionally, poor quality sleep will impact memory in the same way that multiple sclerosis well.  I have recommended that he undergo a neuropsychologic evaluation.    He is interested in establishing care with a sleep physician who understands multiple sclerosis.  He is currently working with a sleep provider, but does not feel that he gets explanations that adequately help him understand his condition.  Therefore, he is requesting second opinion, which I think is reasonable.    We did discuss activities that are helpful for brain health including intellectual activity, physical activity, social activity.    Plan:   -Trial of gabapentin  - Sleep referral  - Neuropsychology evaluation  - Follow-up in 5 months    Note was completed with the assistance of Dragon Fluency software which can often result in accidental word substitutions.     A total of 40 minutes on the date of service were spent in the care of this patient.   Kia Menezes MD on 9/25/2024 at 9:17 AM

## 2024-09-25 NOTE — LETTER
9/25/2024       RE: Miguel Stovall  [address] [facility] [address]     Dear Colleague,    Thank you for referring your patient, Miguel Stovall, to the [facility] MULTIPLE SCLEROSIS CLINIC [place] at [facility]. Please see a copy of my visit note below.    Date of Service: 9/25/2024    [facility] Neurology   MS Clinic Evaluation    Subjective: 65 y/o man who presents in follow up for MS     PMH:   RLS   Mitral valve repair   LITTLE compliant with CPAP     No discrete new symptoms reported today.    Recall that at his last visit he was complaining of worsening word finding, nocturia, balance impairment, leg cramps.  This is in the setting of return of B cells prior to his Ocrevus infusion.  MRI was noted to be negative for any new lesions.  I did give him a steroid burst before his infusion.  This resulted in some improvement in balance and improvement in his bowel movement pattern, though he did not get as much improvement as he has received with past burst of steroids.  He tolerated his Ocrevus infusion well without any adverse effects.  He then had a Medrol Dosepak after his infusion found this helpful and he did not experience the same fatigue that he experienced previously.    He is quite concerned as he continues to struggle with sleep.  This is been an ongoing challenge.  He struggles with significant daytime somnolence.  He did undergo a sleep study and reports that he was only noted to get a couple hours of restful sleep.  It seems that he was told that he has restless legs and was advised to try a therapy for this.  He has some hesitations about trying gabapentin, which were addressed today.    Disease onset: ?age 48, episode of gait instability/ataxia during cardiac rehab   ?age 51, recurrent episode of gait instability      DMD hx:   Glatiramer acetate 5/2012-2/2024, progression of disease  Ocrevus 2/14/24-present, LD 8/28/24    No Known  Allergies    Current Outpatient Medications   Medication Sig Dispense Refill     aspirin 81 MG EC tablet Take 1 tablet by mouth daily       B Complex-C-Folic Acid (DIALYVITE) TABS Take 1 tablet by mouth daily       baclofen (LIORESAL) 10 MG tablet Take 1.5 tablets (15 mg) by mouth 3 times daily 405 tablet 3     Cholecalciferol (VITAMIN D3) 25 MCG (1000 UT) CAPS Takes 10,000 3 times a week and 5000 4 times per week       fish oil-omega-3 fatty acids 1000 MG capsule Take 2 g by mouth daily       gabapentin (NEURONTIN) 300 MG capsule Take 1-2 capsules (300-600 mg) by mouth at bedtime. 60 capsule 5     magnesium oxide (MAG-OX) 400 MG tablet Take 400 mg by mouth       methylPREDNISolone (MEDROL DOSEPAK) 4 MG tablet therapy pack Take all pills for the given day in the morning (6 pills day 1, 5 pills day 2, etc) 21 tablet 0     naproxen (NAPROSYN) 500 MG tablet        rosuvastatin (CRESTOR) 40 MG tablet Take 1 tablet by mouth daily       sildenafil (VIAGRA) 100 MG tablet Take 100 mg by mouth       tamsulosin (FLOMAX) 0.4 MG capsule        Vitamin B Complex-C CAPS Take 1 capsule by mouth       No current facility-administered medications for this visit.        Past medical, surgical, social and family history was personally reviewed. Pertinent details noted above.     Physical Examination:   /78 (BP Location: Left arm, Patient Position: Sitting, Cuff Size: Adult Regular)   Pulse 75   Wt 117.3 kg (258 lb 11.2 oz)   SpO2 96%   BMI 32.12 kg/m      General: no acute distress  Motor:   Tone is normal   Bulk is normal     R L  Hip flexion 5- 5-  Knee flexion 5 5-  Knee ext 5 5  Ankle d/f 5 5-    Reflexes: 2+ and symmetric throughout, babinski absent bilaterally  Sensory: vibration is mild/mod reduced in the toes, mildly reduced JPS  Romberg is present  Coordination: no ataxia or dysmetria  Gait: normal base and stride, tandem gait is mildly impaired with  effort, able to balance on one foot x 10 sec but difficulty  hopping      Tests/Imagin+ ocb in csf    Vitamin D 61  JCV Ab 0.64  28  CD19   Abs Lymph 2400-> 2100  igg 1120-> 1037    MRI brain    - mild/mod periventricular lesion burden, gd-  2023 - no definite new lesions, gd-   2024-no new lesions     MRI cervical spine    - no definite lesions though study not of the best quality   2023 - no definite new lesions  2024 - no new lesions    MRI Thoracic spine    - report indicates lesion T11-12, conus  2023 - no definite lesions  2024 - no new lesions    Assessment: 63 y/o man with chronic multiple sclerosis.  He is now status post first maintenance dose of Ocrevus.  He tolerated the infusion well.  I recommended that he continue with standard dosing.  Only if his B cells were to increase beyond 50 would I advise reducing the time between infusions.  We discussed how Ocrevus does have a cumulative effect.  Therefore he is not anticipated to have as much worsening of symptoms prior to his next infusion.  He voiced understanding.    We reviewed his MRI brain in detail.  We discussed how it is difficult to estimate how a single lesion will impact cognition.  However there is certainly a cumulative effect of the lesions.  Additionally, poor quality sleep will impact memory in the same way that multiple sclerosis well.  I have recommended that he undergo a neuropsychologic evaluation.    He is interested in establishing care with a sleep physician who understands multiple sclerosis.  He is currently working with a sleep provider, but does not feel that he gets explanations that adequately help him understand his condition.  Therefore, he is requesting second opinion, which I think is reasonable.    We did discuss activities that are helpful for brain health including intellectual activity, physical activity, social activity.    Plan:   -Trial of gabapentin  - Sleep referral  - Neuropsychology evaluation  - Follow-up in 5 months    Note was completed  with the assistance of Dragon Fluency software which can often result in accidental word substitutions.     A total of 40 minutes on the date of service were spent in the care of this patient.   Kia Menezes MD on 9/25/2024 at 9:17 AM            Again, thank you for allowing me to participate in the care of your patient.      Sincerely,    Kia Menezes MD

## 2024-12-22 ENCOUNTER — HEALTH MAINTENANCE LETTER (OUTPATIENT)
Age: 64
End: 2024-12-22

## 2025-02-07 ASSESSMENT — SLEEP AND FATIGUE QUESTIONNAIRES
HOW LIKELY ARE YOU TO NOD OFF OR FALL ASLEEP IN A CAR, WHILE STOPPED FOR A FEW MINUTES IN TRAFFIC: SLIGHT CHANCE OF DOZING
HOW LIKELY ARE YOU TO NOD OFF OR FALL ASLEEP WHILE SITTING QUIETLY AFTER LUNCH WITHOUT ALCOHOL: MODERATE CHANCE OF DOZING
HOW LIKELY ARE YOU TO NOD OFF OR FALL ASLEEP WHILE SITTING AND READING: HIGH CHANCE OF DOZING
HOW LIKELY ARE YOU TO NOD OFF OR FALL ASLEEP WHILE LYING DOWN TO REST IN THE AFTERNOON WHEN CIRCUMSTANCES PERMIT: HIGH CHANCE OF DOZING
HOW LIKELY ARE YOU TO NOD OFF OR FALL ASLEEP WHILE SITTING AND TALKING TO SOMEONE: SLIGHT CHANCE OF DOZING
HOW LIKELY ARE YOU TO NOD OFF OR FALL ASLEEP WHEN YOU ARE A PASSENGER IN A CAR FOR AN HOUR WITHOUT A BREAK: HIGH CHANCE OF DOZING
HOW LIKELY ARE YOU TO NOD OFF OR FALL ASLEEP WHILE WATCHING TV: SLIGHT CHANCE OF DOZING
HOW LIKELY ARE YOU TO NOD OFF OR FALL ASLEEP WHILE SITTING INACTIVE IN A PUBLIC PLACE: MODERATE CHANCE OF DOZING

## 2025-02-12 ENCOUNTER — MYC MEDICAL ADVICE (OUTPATIENT)
Dept: SLEEP MEDICINE | Facility: CLINIC | Age: 65
End: 2025-02-12

## 2025-02-12 ENCOUNTER — LAB (OUTPATIENT)
Dept: LAB | Facility: CLINIC | Age: 65
End: 2025-02-12
Payer: COMMERCIAL

## 2025-02-12 ENCOUNTER — OFFICE VISIT (OUTPATIENT)
Dept: SLEEP MEDICINE | Facility: CLINIC | Age: 65
End: 2025-02-12
Attending: PSYCHIATRY & NEUROLOGY
Payer: COMMERCIAL

## 2025-02-12 VITALS
BODY MASS INDEX: 32.39 KG/M2 | HEIGHT: 76 IN | DIASTOLIC BLOOD PRESSURE: 76 MMHG | OXYGEN SATURATION: 97 % | HEART RATE: 60 BPM | SYSTOLIC BLOOD PRESSURE: 115 MMHG | WEIGHT: 266 LBS

## 2025-02-12 DIAGNOSIS — G47.10 HYPERSOMNIA: ICD-10-CM

## 2025-02-12 DIAGNOSIS — G25.81 RESTLESS LEG SYNDROME: ICD-10-CM

## 2025-02-12 DIAGNOSIS — G35 MS (MULTIPLE SCLEROSIS) (H): ICD-10-CM

## 2025-02-12 DIAGNOSIS — G47.00 PERSISTENT DISORDER OF INITIATING OR MAINTAINING SLEEP: ICD-10-CM

## 2025-02-12 DIAGNOSIS — G47.33 OSA (OBSTRUCTIVE SLEEP APNEA): ICD-10-CM

## 2025-02-12 DIAGNOSIS — E61.1 IRON DEFICIENCY: ICD-10-CM

## 2025-02-12 DIAGNOSIS — G47.10 HYPERSOMNIA: Primary | ICD-10-CM

## 2025-02-12 LAB
AMPHETAMINES UR QL SCN: NORMAL
BARBITURATES UR QL SCN: NORMAL
BENZODIAZ UR QL SCN: NORMAL
BZE UR QL SCN: NORMAL
CANNABINOIDS UR QL SCN: NORMAL
FENTANYL UR QL: NORMAL
IRON BINDING CAPACITY (ROCHE): 301 UG/DL (ref 240–430)
IRON SATN MFR SERPL: 33 % (ref 15–46)
IRON SERPL-MCNC: 100 UG/DL (ref 61–157)
OPIATES UR QL SCN: NORMAL
PCP QUAL URINE (ROCHE): NORMAL

## 2025-02-12 PROCEDURE — 82728 ASSAY OF FERRITIN: CPT

## 2025-02-12 PROCEDURE — G2211 COMPLEX E/M VISIT ADD ON: HCPCS | Performed by: INTERNAL MEDICINE

## 2025-02-12 PROCEDURE — 83540 ASSAY OF IRON: CPT

## 2025-02-12 PROCEDURE — 80307 DRUG TEST PRSMV CHEM ANLYZR: CPT

## 2025-02-12 PROCEDURE — 99417 PROLNG OP E/M EACH 15 MIN: CPT | Performed by: INTERNAL MEDICINE

## 2025-02-12 PROCEDURE — 99205 OFFICE O/P NEW HI 60 MIN: CPT | Performed by: INTERNAL MEDICINE

## 2025-02-12 PROCEDURE — 36415 COLL VENOUS BLD VENIPUNCTURE: CPT

## 2025-02-12 RX ORDER — MODAFINIL 100 MG/1
100 TABLET ORAL DAILY
Qty: 30 TABLET | Refills: 1 | Status: SHIPPED | OUTPATIENT
Start: 2025-02-12

## 2025-02-12 NOTE — NURSING NOTE
"Chief Complaint   Patient presents with    Sleep Problem     CPAP does not seem to be treating me well, last clinic could not help me get rest       Initial /76   Pulse 60   Ht 1.93 m (6' 4\")   Wt 120.7 kg (266 lb)   SpO2 97%   BMI 32.38 kg/m   Estimated body mass index is 32.38 kg/m  as calculated from the following:    Height as of this encounter: 1.93 m (6' 4\").    Weight as of this encounter: 120.7 kg (266 lb).    Medication Reconciliation: complete  ESS 16  Neck circumference: 45 centimeters.  Glenda Drummond MA   "

## 2025-02-12 NOTE — PROGRESS NOTES
Sleep Consultation:    Date on this visit: 2/12/2025    Miguel Stovall  is referred by Kia Menezes for a sleep consultation.     Primary Physician: Kevin Byers     Miguel Stovall is a 65 years old male with medical history significant for multiple sclerosis who reports excessive daytime sleepiness for many years.  He was previously under the care of Aydee Castro NP at [facility] sleep clinic at [facility].    Medical history is otherwise significant for history of mitral valve repair and transient atrial fibrillation before mitral valve repair.    He is retired and previously worked in marketing and consulting. He has been retired for 7 years and keeps busy serving on [employer].     Patient reports that he has been experiencing significant daytime sleepiness for the last 7 years.  He cannot specify clear precipitating factors for onset of daytime sleepiness.  This is a change from his premorbid state.  Throughout his youth and working life, he was somewhat of a short sleeper averaging about 6 hours.  He had a busy schedule and would consume caffeine throughout the day.    Current medications include Baclofen 10 mg, Gabapentin 300 mg, Naproxen 500 mg, Flomax 0.4 mg, Rosuvastatin 40 mg at bedtime.     Gabapentin was started 6 months ago.  Prior to this, he was prescribed pramipexole 0.5 mg up to 3 times a day for restless leg symptoms.    I was able to review previous PSG testing.  PSG on 1/22/2018 at a weight of 259 pounds reported an apnea-hypopnea index of 0.5/h (based on 1B criteria).  Based on 1A criteria for hypopneas, an AHI of 10.2/h was reported.  RDI was 10.2/h.  Lowest oxygen saturation was 92%.  PLM index was 132.7/h and PLM arousal index was 34.1/h.    Even though sleep apnea was mild, patient started CPAP therapy for treatment.  He had symptoms of loud snoring and sleep disturbance prior to treatment.  He did experience benefit from CPAP therapy which resolved his snoring.  He  also remembers that he slept better after starting CPAP.    Patient and his wife did not have awareness of excessive movements in sleep.  He did not seem to have significant symptoms of restless leg prior to the sleep study.  Afterwards, he does report symptoms consistent with restless leg intermittently.  The time when he was having symptoms during the day and augmentation with pramipexole cannot be ruled out.  Since switching to gabapentin, restless leg symptoms are adequately treated.    Due to symptoms of excessive daytime sleepiness, a titration PSG was performed on 8/29/2024 at a weight of 265 pounds.  This reported that CPAP treatment was effective at a pressure of 6 cm H2O.  This test Showed a total sleep time of 361 minutes with a sleep efficiency of 69%.  Sleep latency was 59 minutes.  REM latency was 125 minutes.  PLM index was 127/h and PLM arousal index was 26.6/h.    Even though he is using CPAP regularly, and is maintaining regular sleep-wake hours with average of 7 to 8 hours nocturnal sleep, he is excessively sleepy during the day.  He reports briefly closing his eyes/dozing when driving.  He experienced a few episodes in October and November last year.  Last time he had an occurrence was in December.  There is no history of motor vehicle accidents due to drowsy driving.    Overall, he rates the experience with PAP as ok. The mask is comfortable. The mask is not leaking.  He is not snoring with the mask on. He is not having gasp arousals.  He is not having significant oral/nasal dryness. The pressure settings are comfortable.     [employer]Med     Auto-PAP 6-20 cmH2O download:  90/90 total days of use. 0 nonuse days. 96% days with >4 hours use.  Average use 7 hours 57 minutes per day. Median Leak 6.7 L/min. 95%ile Leak 19.5 L/min. CPAP 95% pressure 8 cm. AHI 0.7    Miguel goes to bed at 9 PM and usually falls asleep in 15 minutes. Occasionally, it can take up to an hour to fall asleep. He wakes up 0-1 times  "a night for 5 minutes before falling back to sleep.  Miguel wakes up to go to the bathroom and uncertain reasons.  In the morning, he wakes up at 7 AM.     Miguel denies any sleep walking, sleep talking, dream enactment, sleep paralysis, cataplexy, and hypnogogic/hypnopompic hallucinations.    Patient's Mer[place] Sleepiness score 16/24 consistent with daytime sleepiness.      Miguel naps 3-4 times per week for  minutes, feels refreshed after naps. He takes frequent inadvertant naps.  He admits closing eyes while driving. The most recent episode was 2 month(s) ago.  Patient was counseled on the importance of driving while alert, to pull over if drowsy, or nap before getting into the vehicle if sleepy.      He uses 1 cups/day of coffee. Last caffeine intake is usually before 10 AM.    Problem List:  Patient Active Problem List    Diagnosis Date Noted    MS (multiple sclerosis) (H) 12/15/2023     Priority: Medium        Past Medical/Surgical History:  No past medical history on file.  No past surgical history on file.    Physical Examination:  Vitals: /76   Pulse 60   Ht 1.93 m (6' 4\")   Wt 120.7 kg (266 lb)   SpO2 97%   BMI 32.38 kg/m    BMI= Body mass index is 32.38 kg/m .  GENERAL APPEARANCE: alert and active  HENT: oropharynx crowded  NEURO: mentation intact and speech normal  PSYCH: tearful when describing his daytime sleepiness and associated burden. Mood is depressed with reactive affect. No thought disorders, no suicidal thoughts.   Mallampati Class: IV.  Tonsillar Stage: 1  hidden by pillars.    Impression/Plan:    Obstructive sleep apnea  Restless leg syndrome   Periodic limb movements of sleep   Excessive daytime sleepiness  Multiple sclerosis    As noted, patient is currently on auto CPAP therapy for previously diagnosed mild upper airway resistance.  He is using CPAP regularly and reports that there is a benefit from treatment.  I reviewed download data and graphs from his device for the last " 90 days.  Regular compliance and normal residual AHI is demonstrated on treatment.  A titration PSG in August last year demonstrated adequate treatment.    His previous PSG showed rather high degree of periodic limb movements of sleep.  He is currently on gabapentin 300 mg at bedtime and reports that restless leg symptoms are adequately treated at this dose.    He is maintaining regular sleep-wake hours and averages 7 to 8 hours of nocturnal sleep.  Current medications include baclofen 10 mg and open 300 mg taken at bedtime. Despite maintaining adequate sleep hours, patient reports excessive daytime sleepiness and drowsiness when driving.  As noted, hypersomnia started about 7 years ago and he was a short sleeper without significant sleepiness premorbidly.  Although sedating effect medications need to be entertained, I am not sure if gabapentin or baclofen taken at bedtime could be a significant factor.  He does not have symptoms of cataplexy, sleep paralysis or hypnagogic/hypnopompic hallucinations.    We discussed management options at this time.  One approach will be to perform testing including actigraphy, titration PSG and multiple sleep latency testing to objectively assess sleepiness.  Alternative will be to address sleepiness with pharmacologic agents.  Considering degree of hypersomnia, patient opts to start treatment with a wake promoting agent.  I advised him to restrict driving until we have adequate treatment of sleepiness.  I will start modafinil at a dose of 100 mg in the morning and see him back after a month to review progress.  Dose can be adjusted based on response.    At his follow-up, we will see if we should still plan for actigraphy, titration PSG and either MSLT or MWT work up in the future.    Plan:     Continue auto CPAP therapy  Continue gabapentin 300 mg at bedtime  Check serum ferritin and iron panel to see if iron deficiency is playing a role in restless legs  Start modafinil 100 mg in  the morning (controlled substance agreement was completed and patient underwent urine drug screen) MNPMP verified   Follow-up in about 6 weeks    I spent a total of 90 minutes for this appointment on this date of service which include time spent before, during and after the visit for chart review, patient care, counseling and coordination of care.    I am the single focal point of care for a condition that requires longitudinal relationship and personalized care for condition(s) specified within this medical record.     Electronically signed by Dr. James Blankenship, Diplomate, Sleep Medicine, ABPN       CC: Kia Menezes

## 2025-02-13 LAB — FERRITIN SERPL-MCNC: 192 NG/ML (ref 31–409)

## 2025-02-19 ENCOUNTER — OFFICE VISIT (OUTPATIENT)
Dept: NEUROLOGY | Facility: CLINIC | Age: 65
End: 2025-02-19
Attending: PSYCHIATRY & NEUROLOGY
Payer: COMMERCIAL

## 2025-02-19 VITALS
SYSTOLIC BLOOD PRESSURE: 129 MMHG | DIASTOLIC BLOOD PRESSURE: 85 MMHG | HEIGHT: 76 IN | BODY MASS INDEX: 32.28 KG/M2 | HEART RATE: 70 BPM | WEIGHT: 265.1 LBS | OXYGEN SATURATION: 97 %

## 2025-02-19 DIAGNOSIS — R26.81 GAIT INSTABILITY: ICD-10-CM

## 2025-02-19 DIAGNOSIS — G35 MS (MULTIPLE SCLEROSIS) (H): Primary | ICD-10-CM

## 2025-02-19 DIAGNOSIS — G25.81 RESTLESS LEG SYNDROME: ICD-10-CM

## 2025-02-19 PROCEDURE — G0463 HOSPITAL OUTPT CLINIC VISIT: HCPCS | Performed by: PSYCHIATRY & NEUROLOGY

## 2025-02-19 ASSESSMENT — PAIN SCALES - GENERAL: PAINLEVEL_OUTOF10: NO PAIN (0)

## 2025-02-19 NOTE — PATIENT INSTRUCTIONS
Balance is worse on exam     Proceed with ocrevus     Blood work before infusion - I will include a b6 level and a vitamin E     If no improvement in balance after infusion, please visit with Derrek PT     MRI in 6 months     Follow up after MRI

## 2025-02-19 NOTE — NURSING NOTE
Chief Complaint   Patient presents with    MS    RECHECK     5 month follow up      Vitals were taken and medications were reconciled.   Shine Mcpherson, EMT  9:51 AM

## 2025-02-19 NOTE — PROGRESS NOTES
Date of Service: 2/19/2025    [facility] Neurology   MS Clinic Evaluation    Subjective: 64 y/o man who presents in follow up for MS     PMH:   RLS   Mitral valve repair   LITTLE compliant with CPAP     Today he does report that he is having more difficulty with balance.  He has also had more falls in the last few months.  He shares that he spent time down in [place].  When he was in [place] he was walking 2 and half to 3 miles per day.  This was going quite well and he decided to gradually increase the distance that he was walking.  But once he tried walking 5 to 6 miles he would require several days to recover.  He has been able to learn his limits.  He also struggled when going on long drives.  He was intentional about taking breaks to walk, but feels that it still took him some time to recover.    His main concern today is his sleep.  He did visit with sleep medicine.  He did not feel entirely heard at the appointment, but does plan to follow-up.  He notes that he finds his sleep to be nonrestorative.  His Apple Watch tells him that he is waking up multiple times per night.  He has concerns that the quality of sleep is not adequate.  He has been advised to trial modafinil, but hesitates to do this when he is not sleeping well at night.  He has observed that waking at nighttime can occur because of discomfort related to his sleep apnea mask.  He notes that his wife hears him snore through a closed door.  He is interested in trying to go 1 week sleeping without the mask.  He also notes that periodic limb movements of sleep may be waking him up.    There was some confusion regarding his baclofen.  He felt that this was started for the limb movements in sleep, but I reminded him that he was having significant cramps in his legs.  These cramps have recently returned, but previously been adequately controlled with baclofen.  He has been able to reduce the dose to 1 pill at bedtime.  He is content with his current  dose.    He has Ocrevus coming up.  He has no concerns about receiving this medication at this present time.  No major infections/illnesses since his last visit with me.    Disease onset: ?age 48, episode of gait instability/ataxia during cardiac rehab   ?age 51, recurrent episode of gait instability      DMD hx:   Glatiramer acetate 5/2012-2/2024, progression of disease  Ocrevus 2/14/24-present, LD 8/28/24    No Known Allergies    Current Outpatient Medications   Medication Sig Dispense Refill    aspirin 81 MG EC tablet Take 1 tablet by mouth daily      B Complex-C-Folic Acid (DIALYVITE) TABS Take 1 tablet by mouth daily      baclofen (LIORESAL) 10 MG tablet Take 1.5 tablets (15 mg) by mouth 3 times daily 405 tablet 3    Cholecalciferol (VITAMIN D3) 25 MCG (1000 UT) CAPS Takes 10,000 3 times a week and 5000 4 times per week      Coenzyme Q10 (COQ-10 PO) Take by mouth.      fish oil-omega-3 fatty acids 1000 MG capsule Take 2 g by mouth daily      gabapentin (NEURONTIN) 300 MG capsule Take 1-2 capsules (300-600 mg) by mouth at bedtime. 60 capsule 5    magnesium oxide (MAG-OX) 400 MG tablet Take 400 mg by mouth      methylPREDNISolone (MEDROL DOSEPAK) 4 MG tablet therapy pack Take all pills for the given day in the morning (6 pills day 1, 5 pills day 2, etc) 21 tablet 0    naproxen (NAPROSYN) 500 MG tablet       Ocrelizumab (OCREVUS IV) Inject into the vein.      rosuvastatin (CRESTOR) 40 MG tablet Take 1 tablet by mouth daily      sildenafil (VIAGRA) 100 MG tablet Take 100 mg by mouth      tamsulosin (FLOMAX) 0.4 MG capsule       Vitamin B Complex-C CAPS Take 1 capsule by mouth      modafinil (PROVIGIL) 100 MG tablet Take 1 tablet (100 mg) by mouth daily. (Patient not taking: Reported on 2/19/2025) 30 tablet 1     No current facility-administered medications for this visit.        Past medical, surgical, social and family history was personally reviewed. Pertinent details noted above.     Physical Examination:   BP  "129/85 (BP Location: Left arm, Patient Position: Sitting, Cuff Size: Adult Large)   Pulse 70   Ht 1.93 m (6' 4\")   Wt 120.2 kg (265 lb 1.6 oz)   SpO2 97%   BMI 32.27 kg/m      General: no acute distress  Cranial nerves:   VFFC  PERRL w/no RAPD  EOM full w/no FARZANA   Face symmetric  Hearing intact  No dysarthria   Motor:   Tone is normal   Bulk is normal                           R          L  Deltoid             5          5  Biceps             5          5  Triceps             5          5  Wrist ext 5 5  Finger ext 5 5  Finger abd 5 5     Hip flexion 5 5  Knee flexion 5 5-  Knee ext 5 5  Ankle d/f 5 5     Reflexes: 2+ and symmetric throughout, babinski absent bilaterally  Sensory: vibration is mod reduced in the toes, severely reduced JPS  Romberg is present  Coordination: no ataxia or dysmetria  Gait: slightly widened base with slowed stride of right leg, tandem gait is deferred, difficulty balancing on one foot       Tests/Imagin+ ocb in csf    Vitamin D 61  JCV Ab 0.64  28  CD19   Abs Lymph 2400-> 2100  igg 1120-> 1037    MRI brain    - mild/mod periventricular lesion burden, gd-  2023 - no definite new lesions, gd-   2024-no new lesions     MRI cervical spine    - no definite lesions though study not of the best quality   2023 - no definite new lesions  2024 - no new lesions    MRI Thoracic spine    - report indicates lesion T11-12, conus  2023 - no definite lesions  2024 - no new lesions    Assessment: 66 y/o man with chronic multiple sclerosis.  He is currently receiving Ocrevus and is due for an infusion next week.    Examination today does reveal a decline in proprioception.  He is struggling with balance.  He is taking a number of supplements.  I will assess for vitamin B6 and vitamin E at his next lab appointment.    He has plans to make some adjustments to his sleep routine.  I do think it is reasonable for him to try 1 week with his current dose of gabapentin and not " utilizing his CPAP mask.  If no change in quality of sleep with this adjustment he can then try increasing the dose of gabapentin to 600 mg at bedtime and sleep without his CPAP mask.  If any worsening of sleep quality at any time he should resume his CPAP mask.  He will follow-up with sleep medicine.    He is due for radiologic surveillance for multiple sclerosis in 6 months.  This is advised given the decline in balance.  If balance does not improve after his infusion he is encouraged to follow-up with physical therapy.    Plan:   -Continue gabapentin and baclofen  - Follow-up with sleep medicine  - MRI in 6 months  - Follow-up after MRI    Note was completed with the assistance of Dragon Fluency software which can often result in accidental word substitutions.     A total of 35 minutes on the date of service were spent in the care of this patient.   Kia Menezes MD on 2/19/2025 at 10:19 AM

## 2025-02-19 NOTE — LETTER
2/19/2025       RE: Miguel Stovall  [address] [facility] [id number]     Dear Colleague,    Thank you for referring your patient, Miguel Stovall, to the [facility] MULTIPLE SCLEROSIS CLINIC [place] at [facility]. Please see a copy of my visit note below.    Date of Service: 2/19/2025    [facility] Neurology   MS Clinic Evaluation    Subjective: 66 y/o man who presents in follow up for MS     PMH:   RLS   Mitral valve repair   LITTLE compliant with CPAP     Today he does report that he is having more difficulty with balance.  He has also had more falls in the last few months.  He shares that he spent time down in [place].  When he was in [place] he was walking 2 and half to 3 miles per day.  This was going quite well and he decided to gradually increase the distance that he was walking.  But once he tried walking 5 to 6 miles he would require several days to recover.  He has been able to learn his limits.  He also struggled when going on long drives.  He was intentional about taking breaks to walk, but feels that it still took him some time to recover.    His main concern today is his sleep.  He did visit with sleep medicine.  He did not feel entirely heard at the appointment, but does plan to follow-up.  He notes that he finds his sleep to be nonrestorative.  His Apple Watch tells him that he is waking up multiple times per night.  He has concerns that the quality of sleep is not adequate.  He has been advised to trial modafinil, but hesitates to do this when he is not sleeping well at night.  He has observed that waking at nighttime can occur because of discomfort related to his sleep apnea mask.  He notes that his wife hears him snore through his mask.  He is interested in trying to go 1 week sleeping without the mask.  He also notes that periodic limb movements of sleep may be waking him up.    There was some confusion regarding his baclofen.  He felt  that this was started for the limb movements in sleep, but I reminded him that he was having significant cramps in his legs.  These cramps have recently returned, but previously been adequately controlled with baclofen.  He has been able to reduce the dose to 1 pill at bedtime.  He is content with his current dose.    He has Ocrevus coming up.  He has no concerns about receiving this medication at this present time.  No major infections/illnesses since his last visit with me.    Disease onset: ?age 48, episode of gait instability/ataxia during cardiac rehab   ?age 51, recurrent episode of gait instability      DMD hx:   Glatiramer acetate 5/2012-2/2024, progression of disease  Ocrevus 2/14/24-present, LD 8/28/24    No Known Allergies    Current Outpatient Medications   Medication Sig Dispense Refill     aspirin 81 MG EC tablet Take 1 tablet by mouth daily       B Complex-C-Folic Acid (DIALYVITE) TABS Take 1 tablet by mouth daily       baclofen (LIORESAL) 10 MG tablet Take 1.5 tablets (15 mg) by mouth 3 times daily 405 tablet 3     Cholecalciferol (VITAMIN D3) 25 MCG (1000 UT) CAPS Takes 10,000 3 times a week and 5000 4 times per week       Coenzyme Q10 (COQ-10 PO) Take by mouth.       fish oil-omega-3 fatty acids 1000 MG capsule Take 2 g by mouth daily       gabapentin (NEURONTIN) 300 MG capsule Take 1-2 capsules (300-600 mg) by mouth at bedtime. 60 capsule 5     magnesium oxide (MAG-OX) 400 MG tablet Take 400 mg by mouth       methylPREDNISolone (MEDROL DOSEPAK) 4 MG tablet therapy pack Take all pills for the given day in the morning (6 pills day 1, 5 pills day 2, etc) 21 tablet 0     naproxen (NAPROSYN) 500 MG tablet        Ocrelizumab (OCREVUS IV) Inject into the vein.       rosuvastatin (CRESTOR) 40 MG tablet Take 1 tablet by mouth daily       sildenafil (VIAGRA) 100 MG tablet Take 100 mg by mouth       tamsulosin (FLOMAX) 0.4 MG capsule        Vitamin B Complex-C CAPS Take 1 capsule by mouth       modafinil  "(PROVIGIL) 100 MG tablet Take 1 tablet (100 mg) by mouth daily. (Patient not taking: Reported on 2025) 30 tablet 1     No current facility-administered medications for this visit.        Past medical, surgical, social and family history was personally reviewed. Pertinent details noted above.     Physical Examination:   /85 (BP Location: Left arm, Patient Position: Sitting, Cuff Size: Adult Large)   Pulse 70   Ht 1.93 m (6' 4\")   Wt 120.2 kg (265 lb 1.6 oz)   SpO2 97%   BMI 32.27 kg/m      General: no acute distress  Cranial nerves:   VFFC  PERRL w/no RAPD  EOM full w/no FARZANA   Face symmetric  Hearing intact  No dysarthria   Motor:   Tone is normal   Bulk is normal                           R          L  Deltoid             5          5  Biceps             5          5  Triceps             5          5  Wrist ext 5 5  Finger ext 5 5  Finger abd 5 5     Hip flexion 5 5  Knee flexion 5 5-  Knee ext 5 5  Ankle d/f 5 5     Reflexes: 2+ and symmetric throughout, babinski absent bilaterally  Sensory: vibration is mod reduced in the toes, severely reduced JPS  Romberg is present  Coordination: no ataxia or dysmetria  Gait: slightly widened base with slowed stride of right leg, tandem gait is deferred, difficulty balancing on one foot       Tests/Imagin+ ocb in csf    Vitamin D 61  JCV Ab 0.64  28  CD19   Abs Lymph 2400-> 2100  igg 1120-> 1037    MRI brain    - mild/mod periventricular lesion burden, gd-  2023 - no definite new lesions, gd-   2024-no new lesions     MRI cervical spine    - no definite lesions though study not of the best quality   2023 - no definite new lesions  2024 - no new lesions    MRI Thoracic spine    - report indicates lesion T11-12, conus  2023 - no definite lesions  2024 - no new lesions    Assessment: 66 y/o man with chronic multiple sclerosis.  He is currently receiving Ocrevus and is due for an infusion next week.    Examination today does reveal a " decline in proprioception.  He is struggling with balance.  He is taking a number of supplements.  I will assess for vitamin B6 and vitamin E at his next lab appointment.    He has plans to make some adjustments to his sleep routine.  I do think it is reasonable for him to try 1 week with his current dose of gabapentin and not utilizing his CPAP mask.  If no change in quality of sleep with this adjustment he can then try increasing the dose of gabapentin to 600 mg at bedtime and sleep without his CPAP mask.  If any worsening of sleep quality at any time he should resume his CPAP mask.  He will follow-up with sleep medicine.    He is due for radiologic surveillance for multiple sclerosis in 6 months.  This is advised given the decline in balance.  If balance does not improve after his infusion he is encouraged to follow-up with physical therapy.    Plan:   -Continue gabapentin and baclofen  - Follow-up with sleep medicine  - MRI in 6 months  - Follow-up after MRI    Note was completed with the assistance of Dragon Fluency software which can often result in accidental word substitutions.     A total of 35 minutes on the date of service were spent in the care of this patient.   Kia Menezes MD on 2/19/2025 at 10:19 AM              Again, thank you for allowing me to participate in the care of your patient.      Sincerely,    Kia Menezes MD

## 2025-02-27 ENCOUNTER — INFUSION THERAPY VISIT (OUTPATIENT)
Dept: INFUSION THERAPY | Facility: CLINIC | Age: 65
End: 2025-02-27
Attending: NURSE PRACTITIONER
Payer: COMMERCIAL

## 2025-02-27 ENCOUNTER — LAB (OUTPATIENT)
Dept: INFUSION THERAPY | Facility: CLINIC | Age: 65
End: 2025-02-27
Attending: PSYCHIATRY & NEUROLOGY
Payer: COMMERCIAL

## 2025-02-27 VITALS
WEIGHT: 263.9 LBS | TEMPERATURE: 97.9 F | DIASTOLIC BLOOD PRESSURE: 79 MMHG | HEART RATE: 65 BPM | OXYGEN SATURATION: 95 % | SYSTOLIC BLOOD PRESSURE: 117 MMHG | RESPIRATION RATE: 16 BRPM | BODY MASS INDEX: 32.12 KG/M2

## 2025-02-27 DIAGNOSIS — G35 MS (MULTIPLE SCLEROSIS) (H): Primary | ICD-10-CM

## 2025-02-27 DIAGNOSIS — Z51.81 THERAPEUTIC DRUG MONITORING: ICD-10-CM

## 2025-02-27 LAB
BASOPHILS # BLD AUTO: 0 10E3/UL (ref 0–0.2)
BASOPHILS NFR BLD AUTO: 1 %
CD19 B CELL COMMENT: ABNORMAL
CD19 CELLS # BLD: 17 CELLS/UL (ref 107–698)
CD19 CELLS NFR BLD: 1 % (ref 6–27)
EOSINOPHIL # BLD AUTO: 0.1 10E3/UL (ref 0–0.7)
EOSINOPHIL NFR BLD AUTO: 2 %
ERYTHROCYTE [DISTWIDTH] IN BLOOD BY AUTOMATED COUNT: 12.7 % (ref 10–15)
HCT VFR BLD AUTO: 46.8 % (ref 40–53)
HGB BLD-MCNC: 16.3 G/DL (ref 13.3–17.7)
IMM GRANULOCYTES # BLD: 0 10E3/UL
IMM GRANULOCYTES NFR BLD: 0 %
LYMPHOCYTES # BLD AUTO: 1.6 10E3/UL (ref 0.8–5.3)
LYMPHOCYTES NFR BLD AUTO: 31 %
MCH RBC QN AUTO: 29.8 PG (ref 26.5–33)
MCHC RBC AUTO-ENTMCNC: 34.8 G/DL (ref 31.5–36.5)
MCV RBC AUTO: 86 FL (ref 78–100)
MONOCYTES # BLD AUTO: 0.6 10E3/UL (ref 0–1.3)
MONOCYTES NFR BLD AUTO: 12 %
NEUTROPHILS # BLD AUTO: 2.9 10E3/UL (ref 1.6–8.3)
NEUTROPHILS NFR BLD AUTO: 55 %
NRBC # BLD AUTO: 0 10E3/UL
NRBC BLD AUTO-RTO: 0 /100
PLATELET # BLD AUTO: 198 10E3/UL (ref 150–450)
RBC # BLD AUTO: 5.47 10E6/UL (ref 4.4–5.9)
WBC # BLD AUTO: 5.2 10E3/UL (ref 4–11)

## 2025-02-27 PROCEDURE — 250N000013 HC RX MED GY IP 250 OP 250 PS 637: Performed by: PSYCHIATRY & NEUROLOGY

## 2025-02-27 PROCEDURE — 250N000011 HC RX IP 250 OP 636: Mod: JZ | Performed by: PSYCHIATRY & NEUROLOGY

## 2025-02-27 PROCEDURE — 86355 B CELLS TOTAL COUNT: CPT

## 2025-02-27 PROCEDURE — 258N000003 HC RX IP 258 OP 636: Performed by: PSYCHIATRY & NEUROLOGY

## 2025-02-27 PROCEDURE — 84207 ASSAY OF VITAMIN B-6: CPT

## 2025-02-27 PROCEDURE — 82525 ASSAY OF COPPER: CPT

## 2025-02-27 PROCEDURE — 84446 ASSAY OF VITAMIN E: CPT

## 2025-02-27 PROCEDURE — 85025 COMPLETE CBC W/AUTO DIFF WBC: CPT

## 2025-02-27 RX ORDER — EPINEPHRINE 1 MG/ML
0.3 INJECTION, SOLUTION INTRAMUSCULAR; SUBCUTANEOUS EVERY 5 MIN PRN
OUTPATIENT
Start: 2025-08-23

## 2025-02-27 RX ORDER — DIPHENHYDRAMINE HYDROCHLORIDE 50 MG/ML
50 INJECTION INTRAMUSCULAR; INTRAVENOUS
Status: CANCELLED
Start: 2025-08-18

## 2025-02-27 RX ORDER — DIPHENHYDRAMINE HCL 25 MG
50 CAPSULE ORAL ONCE
Status: CANCELLED | OUTPATIENT
Start: 2025-08-18

## 2025-02-27 RX ORDER — METHYLPREDNISOLONE SODIUM SUCCINATE 125 MG/2ML
125 INJECTION INTRAMUSCULAR; INTRAVENOUS ONCE
Status: CANCELLED | OUTPATIENT
Start: 2025-08-18

## 2025-02-27 RX ORDER — METHYLPREDNISOLONE SODIUM SUCCINATE 125 MG/2ML
125 INJECTION INTRAMUSCULAR; INTRAVENOUS
Status: CANCELLED
Start: 2025-08-18

## 2025-02-27 RX ORDER — ACETAMINOPHEN 325 MG/1
650 TABLET ORAL ONCE
Status: CANCELLED | OUTPATIENT
Start: 2025-08-18

## 2025-02-27 RX ORDER — ACETAMINOPHEN 325 MG/1
650 TABLET ORAL ONCE
OUTPATIENT
Start: 2025-08-23

## 2025-02-27 RX ORDER — HEPARIN SODIUM,PORCINE 10 UNIT/ML
5-20 VIAL (ML) INTRAVENOUS DAILY PRN
Status: CANCELLED | OUTPATIENT
Start: 2025-08-18

## 2025-02-27 RX ORDER — METHYLPREDNISOLONE SODIUM SUCCINATE 125 MG/2ML
125 INJECTION INTRAMUSCULAR; INTRAVENOUS ONCE
Status: COMPLETED | OUTPATIENT
Start: 2025-02-27 | End: 2025-02-27

## 2025-02-27 RX ORDER — HEPARIN SODIUM (PORCINE) LOCK FLUSH IV SOLN 100 UNIT/ML 100 UNIT/ML
5 SOLUTION INTRAVENOUS
OUTPATIENT
Start: 2025-08-23

## 2025-02-27 RX ORDER — ALBUTEROL SULFATE 0.83 MG/ML
2.5 SOLUTION RESPIRATORY (INHALATION)
Status: CANCELLED | OUTPATIENT
Start: 2025-08-18

## 2025-02-27 RX ORDER — ALBUTEROL SULFATE 90 UG/1
1-2 INHALANT RESPIRATORY (INHALATION)
Status: CANCELLED
Start: 2025-08-18

## 2025-02-27 RX ORDER — MEPERIDINE HYDROCHLORIDE 25 MG/ML
25 INJECTION INTRAMUSCULAR; INTRAVENOUS; SUBCUTANEOUS EVERY 30 MIN PRN
OUTPATIENT
Start: 2025-08-23

## 2025-02-27 RX ORDER — HEPARIN SODIUM (PORCINE) LOCK FLUSH IV SOLN 100 UNIT/ML 100 UNIT/ML
5 SOLUTION INTRAVENOUS
Status: CANCELLED | OUTPATIENT
Start: 2025-08-18

## 2025-02-27 RX ORDER — MEPERIDINE HYDROCHLORIDE 25 MG/ML
25 INJECTION INTRAMUSCULAR; INTRAVENOUS; SUBCUTANEOUS EVERY 30 MIN PRN
Status: CANCELLED | OUTPATIENT
Start: 2025-08-18

## 2025-02-27 RX ORDER — ACETAMINOPHEN 325 MG/1
650 TABLET ORAL ONCE
Status: COMPLETED | OUTPATIENT
Start: 2025-02-27 | End: 2025-02-27

## 2025-02-27 RX ORDER — DIPHENHYDRAMINE HCL 25 MG
50 CAPSULE ORAL ONCE
Status: COMPLETED | OUTPATIENT
Start: 2025-02-27 | End: 2025-02-27

## 2025-02-27 RX ORDER — EPINEPHRINE 1 MG/ML
0.3 INJECTION, SOLUTION INTRAMUSCULAR; SUBCUTANEOUS EVERY 5 MIN PRN
Status: CANCELLED | OUTPATIENT
Start: 2025-08-18

## 2025-02-27 RX ORDER — ALBUTEROL SULFATE 90 UG/1
1-2 INHALANT RESPIRATORY (INHALATION)
Start: 2025-08-23

## 2025-02-27 RX ORDER — METHYLPREDNISOLONE SODIUM SUCCINATE 125 MG/2ML
125 INJECTION INTRAMUSCULAR; INTRAVENOUS
Start: 2025-08-23

## 2025-02-27 RX ORDER — DIPHENHYDRAMINE HYDROCHLORIDE 50 MG/ML
50 INJECTION INTRAMUSCULAR; INTRAVENOUS
Start: 2025-08-23

## 2025-02-27 RX ORDER — ALBUTEROL SULFATE 0.83 MG/ML
2.5 SOLUTION RESPIRATORY (INHALATION)
OUTPATIENT
Start: 2025-08-23

## 2025-02-27 RX ORDER — METHYLPREDNISOLONE SODIUM SUCCINATE 125 MG/2ML
125 INJECTION INTRAMUSCULAR; INTRAVENOUS ONCE
OUTPATIENT
Start: 2025-08-23

## 2025-02-27 RX ORDER — HEPARIN SODIUM,PORCINE 10 UNIT/ML
5-20 VIAL (ML) INTRAVENOUS DAILY PRN
OUTPATIENT
Start: 2025-08-23

## 2025-02-27 RX ORDER — DIPHENHYDRAMINE HCL 25 MG
50 CAPSULE ORAL ONCE
OUTPATIENT
Start: 2025-08-23

## 2025-02-27 RX ADMIN — SODIUM CHLORIDE 250 ML: 9 INJECTION, SOLUTION INTRAVENOUS at 08:42

## 2025-02-27 RX ADMIN — ACETAMINOPHEN 650 MG: 325 TABLET ORAL at 08:33

## 2025-02-27 RX ADMIN — SODIUM CHLORIDE 600 MG: 9 INJECTION, SOLUTION INTRAVENOUS at 08:58

## 2025-02-27 RX ADMIN — DIPHENHYDRAMINE HYDROCHLORIDE 50 MG: 25 CAPSULE ORAL at 08:32

## 2025-02-27 RX ADMIN — METHYLPREDNISOLONE SODIUM SUCCINATE 125 MG: 125 INJECTION, POWDER, FOR SOLUTION INTRAMUSCULAR; INTRAVENOUS at 08:45

## 2025-02-27 ASSESSMENT — PAIN SCALES - GENERAL: PAINLEVEL_OUTOF10: NO PAIN (0)

## 2025-03-03 LAB
A-TOCOPHEROL VIT E SERPL-MCNC: 8.4 MG/L
BETA+GAMMA TOCOPHEROL SERPL-MCNC: 1.4 MG/L
PYRIDOXAL PHOS SERPL-SCNC: 44.5 NMOL/L

## 2025-04-10 ASSESSMENT — SLEEP AND FATIGUE QUESTIONNAIRES
HOW LIKELY ARE YOU TO NOD OFF OR FALL ASLEEP WHILE WATCHING TV: WOULD NEVER DOZE
HOW LIKELY ARE YOU TO NOD OFF OR FALL ASLEEP WHILE SITTING AND TALKING TO SOMEONE: WOULD NEVER DOZE
HOW LIKELY ARE YOU TO NOD OFF OR FALL ASLEEP WHEN YOU ARE A PASSENGER IN A CAR FOR AN HOUR WITHOUT A BREAK: SLIGHT CHANCE OF DOZING
HOW LIKELY ARE YOU TO NOD OFF OR FALL ASLEEP IN A CAR, WHILE STOPPED FOR A FEW MINUTES IN TRAFFIC: WOULD NEVER DOZE
HOW LIKELY ARE YOU TO NOD OFF OR FALL ASLEEP WHILE SITTING QUIETLY AFTER LUNCH WITHOUT ALCOHOL: SLIGHT CHANCE OF DOZING
HOW LIKELY ARE YOU TO NOD OFF OR FALL ASLEEP WHILE SITTING INACTIVE IN A PUBLIC PLACE: WOULD NEVER DOZE
HOW LIKELY ARE YOU TO NOD OFF OR FALL ASLEEP WHILE SITTING AND READING: SLIGHT CHANCE OF DOZING
HOW LIKELY ARE YOU TO NOD OFF OR FALL ASLEEP WHILE LYING DOWN TO REST IN THE AFTERNOON WHEN CIRCUMSTANCES PERMIT: HIGH CHANCE OF DOZING

## 2025-04-15 ENCOUNTER — OFFICE VISIT (OUTPATIENT)
Dept: SLEEP MEDICINE | Facility: CLINIC | Age: 65
End: 2025-04-15
Payer: COMMERCIAL

## 2025-04-15 VITALS
DIASTOLIC BLOOD PRESSURE: 73 MMHG | HEIGHT: 76 IN | OXYGEN SATURATION: 97 % | BODY MASS INDEX: 32.08 KG/M2 | HEART RATE: 64 BPM | SYSTOLIC BLOOD PRESSURE: 112 MMHG | WEIGHT: 263.4 LBS

## 2025-04-15 DIAGNOSIS — G47.33 OSA (OBSTRUCTIVE SLEEP APNEA): Primary | ICD-10-CM

## 2025-04-15 PROCEDURE — 99213 OFFICE O/P EST LOW 20 MIN: CPT | Performed by: INTERNAL MEDICINE

## 2025-04-15 PROCEDURE — 3074F SYST BP LT 130 MM HG: CPT | Performed by: INTERNAL MEDICINE

## 2025-04-15 PROCEDURE — 1126F AMNT PAIN NOTED NONE PRSNT: CPT | Performed by: INTERNAL MEDICINE

## 2025-04-15 PROCEDURE — 3078F DIAST BP <80 MM HG: CPT | Performed by: INTERNAL MEDICINE

## 2025-04-15 NOTE — NURSING NOTE
"Chief Complaint   Patient presents with    CPAP Follow Up     Hasn't been using cpap since last appt, mask leakage, thinks cpap is causing more difficulty, interested in mouth gaurd       Initial /73   Pulse 64   Ht 1.93 m (6' 3.98\")   Wt 119.5 kg (263 lb 6.4 oz)   SpO2 97%   BMI 32.08 kg/m   Estimated body mass index is 32.08 kg/m  as calculated from the following:    Height as of this encounter: 1.93 m (6' 3.98\").    Weight as of this encounter: 119.5 kg (263 lb 6.4 oz).    Medication Reconciliation: complete  ESS: 6  Neck circumference: 47 cm    DME: [facility]    Dada Chen, ANDRES      "

## 2025-04-15 NOTE — PROGRESS NOTES
"Presenting History:     Miguel Stovall is a 65 years old male with medical history significant multiple sclerosis, who presents to clinic for follow up of mild sleep apnea and sleep disturbance.    Detailed history can be found on my initial consultation note on 2/12/2025.    PSG on 1/22/2018 at a weight of 259 pounds reported an apnea-hypopnea index of 0.5/h (based on 1B criteria). Based on 1A criteria for hypopneas, an AHI of 10.2/h was reported. RDI was 10.2/h. Lowest oxygen saturation was 92%. PLM index was 132.7/h and PLM arousal index was 34.1/h.     He is prescribed gabapentin 300 mg at night to manage periodic limb movements of sleep.  Treatment is adequately treating his symptoms.    Patient did not benefit from CPAP therapy for mild sleep apnea, and reported that there was more sleep disturbance with CPAP. He has now discontinued CPAP use. He reports that sleep quality and daytime functioning is better since he discontinued CPAP.  He no longer experiences significant daytime sleepiness.    I had advised modafinil 100 mg to manage daytime sleepiness.  Since daytime sleepiness is resolved, he did not start modafinil.    /73   Pulse 64   Ht 1.93 m (6' 3.98\")   Wt 119.5 kg (263 lb 6.4 oz)   SpO2 97%   BMI 32.08 kg/m      Physical Examination:  GENERAL APPEARANCE: healthy, alert, and no distress  NEURO: mentation intact and speech normal  PSYCH: mentation appears normal and affect normal/bright    Impression & Plan:    Mild obstructive sleep apnea   Periodic limb movements of sleep   Multiple sclerosis     As noted, patient has rather mild sleep apnea, and have not benefited from CPAP therapy.  In fact, he reports having more sleep disturbance and daytime sleepiness when he uses CPAP.  He has now discontinued CPAP therapy and apparently his sleepiness has resolved.    Snoring continues to be a concern.  Alternate therapy options were discussed, and patient decides to pursue oral appliance therapy.  " Referral to dental sleep medicine was provided.    He did not try modafinil 100 mg that I previously prescribed.  Significant sleepiness is resolved.    He continues to have adequate treatment of the periodic limb movements of sleep with gabapentin 300 mg taken at bedtime.    Plan:     Dental sleep medicine referral for treatment of mild obstructive sleep apnea  Follow-up as needed    I spent a total of 25 minutes for this appointment on this date of service which include time spent before, during and after the visit for chart review, patient care, counseling and coordination of care.    Electronically signed by Dr. James Blankenship, Diplomate, Sleep Medicine, ABPN

## 2025-04-24 ENCOUNTER — TELEPHONE (OUTPATIENT)
Dept: SLEEP MEDICINE | Facility: CLINIC | Age: 65
End: 2025-04-24
Payer: COMMERCIAL

## 2025-04-29 ENCOUNTER — MYC MEDICAL ADVICE (OUTPATIENT)
Dept: SLEEP MEDICINE | Facility: CLINIC | Age: 65
End: 2025-04-29
Payer: COMMERCIAL

## 2025-06-05 DIAGNOSIS — G25.81 RESTLESS LEGS SYNDROME (RLS): ICD-10-CM

## 2025-06-06 RX ORDER — GABAPENTIN 300 MG/1
300-600 CAPSULE ORAL AT BEDTIME
Qty: 60 CAPSULE | Refills: 5 | Status: SHIPPED | OUTPATIENT
Start: 2025-06-06

## 2025-06-06 NOTE — TELEPHONE ENCOUNTER
Received refill request for gabapentin from [facility]; Patient was last seen in Feb 2025 and has follow up appointment in Aug 2025 with Dr Menezes. Refilled per [facility] refill protocol.    Masha Reynaga RN

## 2025-07-29 ENCOUNTER — TELEPHONE (OUTPATIENT)
Dept: NEUROPSYCHOLOGY | Facility: CLINIC | Age: 65
End: 2025-07-29
Payer: COMMERCIAL

## 2025-07-29 NOTE — TELEPHONE ENCOUNTER
I left a message and sent a [employer] message for this patient informing him that his appointment time changed. His appt has been moved from 8am to 12:30pm on 7/30/25, same date, location and provider.     Provided pt with our phone number if this does not work for him.     Clare Boo on 7/29/2025 at 10:18 AM

## 2025-08-19 ENCOUNTER — HOSPITAL ENCOUNTER (OUTPATIENT)
Dept: MRI IMAGING | Facility: CLINIC | Age: 65
Discharge: HOME | End: 2025-08-19
Attending: PSYCHIATRY & NEUROLOGY
Payer: COMMERCIAL

## 2025-08-19 DIAGNOSIS — G35 MS (MULTIPLE SCLEROSIS) (H): ICD-10-CM

## 2025-08-19 PROCEDURE — A9585 GADOBUTROL INJECTION: HCPCS | Performed by: PSYCHIATRY & NEUROLOGY

## 2025-08-19 PROCEDURE — 72156 MRI NECK SPINE W/O & W/DYE: CPT

## 2025-08-19 PROCEDURE — 70553 MRI BRAIN STEM W/O & W/DYE: CPT

## 2025-08-19 PROCEDURE — 255N000002 HC RX 255 OP 636: Performed by: PSYCHIATRY & NEUROLOGY

## 2025-08-19 PROCEDURE — 72157 MRI CHEST SPINE W/O & W/DYE: CPT

## 2025-08-19 RX ORDER — GADOBUTROL 604.72 MG/ML
12 INJECTION INTRAVENOUS ONCE
Status: COMPLETED | OUTPATIENT
Start: 2025-08-19 | End: 2025-08-19

## 2025-08-19 RX ADMIN — GADOBUTROL 12 ML: 604.72 INJECTION INTRAVENOUS at 09:53

## 2025-08-20 ENCOUNTER — OFFICE VISIT (OUTPATIENT)
Dept: NEUROLOGY | Facility: CLINIC | Age: 65
End: 2025-08-20
Attending: PSYCHIATRY & NEUROLOGY
Payer: COMMERCIAL

## 2025-08-20 VITALS
SYSTOLIC BLOOD PRESSURE: 132 MMHG | HEART RATE: 56 BPM | BODY MASS INDEX: 32.19 KG/M2 | DIASTOLIC BLOOD PRESSURE: 73 MMHG | WEIGHT: 264.3 LBS | OXYGEN SATURATION: 96 %

## 2025-08-20 DIAGNOSIS — R26.81 GAIT INSTABILITY: ICD-10-CM

## 2025-08-20 DIAGNOSIS — G35 MS (MULTIPLE SCLEROSIS) (H): Primary | ICD-10-CM

## 2025-08-20 DIAGNOSIS — G25.81 RESTLESS LEGS SYNDROME (RLS): ICD-10-CM

## 2025-08-20 PROCEDURE — G0463 HOSPITAL OUTPT CLINIC VISIT: HCPCS | Performed by: PSYCHIATRY & NEUROLOGY

## 2025-08-20 ASSESSMENT — PAIN SCALES - GENERAL: PAINLEVEL_OUTOF10: NO PAIN (0)

## 2025-08-27 ENCOUNTER — LAB (OUTPATIENT)
Dept: LAB | Facility: CLINIC | Age: 65
End: 2025-08-27
Payer: COMMERCIAL

## 2025-08-27 ENCOUNTER — INFUSION THERAPY VISIT (OUTPATIENT)
Dept: INFUSION THERAPY | Facility: CLINIC | Age: 65
End: 2025-08-27
Attending: INTERNAL MEDICINE
Payer: COMMERCIAL

## 2025-08-27 VITALS
SYSTOLIC BLOOD PRESSURE: 114 MMHG | BODY MASS INDEX: 32.51 KG/M2 | DIASTOLIC BLOOD PRESSURE: 72 MMHG | TEMPERATURE: 97.5 F | HEART RATE: 62 BPM | RESPIRATION RATE: 14 BRPM | WEIGHT: 267 LBS | OXYGEN SATURATION: 97 %

## 2025-08-27 DIAGNOSIS — Z51.81 THERAPEUTIC DRUG MONITORING: ICD-10-CM

## 2025-08-27 DIAGNOSIS — G35 MS (MULTIPLE SCLEROSIS) (H): Primary | ICD-10-CM

## 2025-08-27 LAB
BASOPHILS # BLD AUTO: <0.03 10E3/UL (ref 0–0.2)
BASOPHILS NFR BLD AUTO: 0.3 %
CD19 B CELL COMMENT: ABNORMAL
CD19 CELLS # BLD: 11 CELLS/UL (ref 107–698)
CD19 CELLS NFR BLD: 1 % (ref 6–27)
EOSINOPHIL # BLD AUTO: 0.1 10E3/UL (ref 0–0.7)
EOSINOPHIL NFR BLD AUTO: 1.7 %
ERYTHROCYTE [DISTWIDTH] IN BLOOD BY AUTOMATED COUNT: 12.9 % (ref 10–15)
HCT VFR BLD AUTO: 45.2 % (ref 40–53)
HGB BLD-MCNC: 15.5 G/DL (ref 13.3–17.7)
HOLD SPECIMEN: NORMAL
IMM GRANULOCYTES # BLD: <0.03 10E3/UL
IMM GRANULOCYTES NFR BLD: 0.3 %
LYMPHOCYTES # BLD AUTO: 1.56 10E3/UL (ref 0.8–5.3)
LYMPHOCYTES NFR BLD AUTO: 27.3 %
MCH RBC QN AUTO: 29.5 PG (ref 26.5–33)
MCHC RBC AUTO-ENTMCNC: 34.3 G/DL (ref 31.5–36.5)
MCV RBC AUTO: 85.9 FL (ref 78–100)
MONOCYTES # BLD AUTO: 0.6 10E3/UL (ref 0–1.3)
MONOCYTES NFR BLD AUTO: 10.5 %
NEUTROPHILS # BLD AUTO: 3.42 10E3/UL (ref 1.6–8.3)
NEUTROPHILS NFR BLD AUTO: 59.9 %
NRBC # BLD AUTO: <0.03 10E3/UL
NRBC BLD AUTO-RTO: 0 /100
PLATELET # BLD AUTO: 206 10E3/UL (ref 150–450)
RBC # BLD AUTO: 5.26 10E6/UL (ref 4.4–5.9)
WBC # BLD AUTO: 5.72 10E3/UL (ref 4–11)

## 2025-08-27 PROCEDURE — 99207 PR NO CHARGE LOS: CPT

## 2025-08-27 PROCEDURE — 36415 COLL VENOUS BLD VENIPUNCTURE: CPT

## 2025-08-27 PROCEDURE — 258N000003 HC RX IP 258 OP 636: Performed by: PSYCHIATRY & NEUROLOGY

## 2025-08-27 PROCEDURE — 82784 ASSAY IGA/IGD/IGG/IGM EACH: CPT

## 2025-08-27 PROCEDURE — 86355 B CELLS TOTAL COUNT: CPT

## 2025-08-27 PROCEDURE — 96375 TX/PRO/DX INJ NEW DRUG ADDON: CPT

## 2025-08-27 PROCEDURE — 96365 THER/PROPH/DIAG IV INF INIT: CPT

## 2025-08-27 PROCEDURE — 96366 THER/PROPH/DIAG IV INF ADDON: CPT

## 2025-08-27 PROCEDURE — 85025 COMPLETE CBC W/AUTO DIFF WBC: CPT

## 2025-08-27 PROCEDURE — 250N000013 HC RX MED GY IP 250 OP 250 PS 637: Performed by: PSYCHIATRY & NEUROLOGY

## 2025-08-27 PROCEDURE — 250N000011 HC RX IP 250 OP 636: Mod: JZ | Performed by: PSYCHIATRY & NEUROLOGY

## 2025-08-27 RX ORDER — ALBUTEROL SULFATE 90 UG/1
1-2 INHALANT RESPIRATORY (INHALATION)
Start: 2026-02-22

## 2025-08-27 RX ORDER — HEPARIN SODIUM,PORCINE 10 UNIT/ML
5-20 VIAL (ML) INTRAVENOUS DAILY PRN
OUTPATIENT
Start: 2026-02-22

## 2025-08-27 RX ORDER — METHYLPREDNISOLONE SODIUM SUCCINATE 125 MG/2ML
125 INJECTION INTRAMUSCULAR; INTRAVENOUS ONCE
OUTPATIENT
Start: 2026-02-22

## 2025-08-27 RX ORDER — METHYLPREDNISOLONE SODIUM SUCCINATE 125 MG/2ML
125 INJECTION INTRAMUSCULAR; INTRAVENOUS ONCE
Status: COMPLETED | OUTPATIENT
Start: 2025-08-27 | End: 2025-08-27

## 2025-08-27 RX ORDER — HEPARIN SODIUM,PORCINE 10 UNIT/ML
5-20 VIAL (ML) INTRAVENOUS DAILY PRN
Status: DISCONTINUED | OUTPATIENT
Start: 2025-08-27 | End: 2025-08-27 | Stop reason: HOSPADM

## 2025-08-27 RX ORDER — ALBUTEROL SULFATE 0.83 MG/ML
2.5 SOLUTION RESPIRATORY (INHALATION)
OUTPATIENT
Start: 2026-02-22

## 2025-08-27 RX ORDER — MAGNESIUM OXIDE 400 MG/1
400 TABLET ORAL DAILY
COMMUNITY

## 2025-08-27 RX ORDER — HEPARIN SODIUM (PORCINE) LOCK FLUSH IV SOLN 100 UNIT/ML 100 UNIT/ML
5 SOLUTION INTRAVENOUS
OUTPATIENT
Start: 2026-02-22

## 2025-08-27 RX ORDER — DIPHENHYDRAMINE HYDROCHLORIDE 50 MG/ML
50 INJECTION, SOLUTION INTRAMUSCULAR; INTRAVENOUS
Start: 2026-02-22

## 2025-08-27 RX ORDER — DIPHENHYDRAMINE HCL 25 MG
50 CAPSULE ORAL ONCE
OUTPATIENT
Start: 2026-02-22

## 2025-08-27 RX ORDER — MEPERIDINE HYDROCHLORIDE 25 MG/ML
25 INJECTION INTRAMUSCULAR; INTRAVENOUS; SUBCUTANEOUS EVERY 30 MIN PRN
OUTPATIENT
Start: 2026-02-22

## 2025-08-27 RX ORDER — ACETAMINOPHEN 325 MG/1
650 TABLET ORAL ONCE
OUTPATIENT
Start: 2026-02-22

## 2025-08-27 RX ORDER — ACETAMINOPHEN 325 MG/1
650 TABLET ORAL ONCE
Status: COMPLETED | OUTPATIENT
Start: 2025-08-27 | End: 2025-08-27

## 2025-08-27 RX ORDER — DIPHENHYDRAMINE HCL 25 MG
50 CAPSULE ORAL ONCE
Status: COMPLETED | OUTPATIENT
Start: 2025-08-27 | End: 2025-08-27

## 2025-08-27 RX ORDER — EPINEPHRINE 1 MG/ML
0.3 INJECTION, SOLUTION INTRAMUSCULAR; SUBCUTANEOUS EVERY 5 MIN PRN
OUTPATIENT
Start: 2026-02-22

## 2025-08-27 RX ORDER — METHYLPREDNISOLONE SODIUM SUCCINATE 125 MG/2ML
125 INJECTION INTRAMUSCULAR; INTRAVENOUS
Start: 2026-02-22

## 2025-08-27 RX ADMIN — METHYLPREDNISOLONE SODIUM SUCCINATE 125 MG: 125 INJECTION, POWDER, FOR SOLUTION INTRAMUSCULAR; INTRAVENOUS at 10:43

## 2025-08-27 RX ADMIN — DIPHENHYDRAMINE HYDROCHLORIDE 50 MG: 25 CAPSULE ORAL at 10:43

## 2025-08-27 RX ADMIN — SODIUM CHLORIDE 250 ML: 0.9 INJECTION, SOLUTION INTRAVENOUS at 10:37

## 2025-08-27 RX ADMIN — ACETAMINOPHEN 650 MG: 325 TABLET ORAL at 10:43

## 2025-08-27 RX ADMIN — OCRELIZUMAB 600 MG: 300 INJECTION INTRAVENOUS at 10:53

## 2025-08-28 LAB — IGG SERPL-MCNC: 963 MG/DL (ref 610–1616)
